# Patient Record
Sex: MALE | HISPANIC OR LATINO | Employment: OTHER | URBAN - NONMETROPOLITAN AREA
[De-identification: names, ages, dates, MRNs, and addresses within clinical notes are randomized per-mention and may not be internally consistent; named-entity substitution may affect disease eponyms.]

---

## 2017-06-21 ENCOUNTER — HOSPITAL ENCOUNTER (OUTPATIENT)
Dept: LAB | Facility: MEDICAL CENTER | Age: 67
End: 2017-06-21
Attending: UROLOGY
Payer: MEDICARE

## 2017-06-21 LAB — PSA SERPL-MCNC: 5.53 NG/ML (ref 0–4)

## 2017-06-21 PROCEDURE — 84153 ASSAY OF PSA TOTAL: CPT

## 2017-06-21 PROCEDURE — 36415 COLL VENOUS BLD VENIPUNCTURE: CPT

## 2018-07-20 ENCOUNTER — HOSPITAL ENCOUNTER (OUTPATIENT)
Dept: LAB | Facility: MEDICAL CENTER | Age: 68
End: 2018-07-20
Attending: UROLOGY
Payer: MEDICARE

## 2018-07-20 LAB — PSA SERPL-MCNC: 5.05 NG/ML (ref 0–4)

## 2018-07-20 PROCEDURE — 84153 ASSAY OF PSA TOTAL: CPT

## 2018-07-20 PROCEDURE — 36415 COLL VENOUS BLD VENIPUNCTURE: CPT

## 2019-08-13 ENCOUNTER — HOSPITAL ENCOUNTER (OUTPATIENT)
Dept: LAB | Facility: MEDICAL CENTER | Age: 69
End: 2019-08-13
Attending: UROLOGY
Payer: MEDICARE

## 2019-08-13 LAB — PSA SERPL-MCNC: 3.55 NG/ML (ref 0–4)

## 2019-08-13 PROCEDURE — 36415 COLL VENOUS BLD VENIPUNCTURE: CPT

## 2019-08-13 PROCEDURE — 84153 ASSAY OF PSA TOTAL: CPT

## 2019-12-03 ENCOUNTER — HOSPITAL ENCOUNTER (OUTPATIENT)
Dept: LAB | Facility: MEDICAL CENTER | Age: 69
End: 2019-12-03
Attending: STUDENT IN AN ORGANIZED HEALTH CARE EDUCATION/TRAINING PROGRAM
Payer: MEDICARE

## 2019-12-03 LAB
ALBUMIN SERPL BCP-MCNC: 4.4 G/DL (ref 3.2–4.9)
ALBUMIN/GLOB SERPL: 1.6 G/DL
ALP SERPL-CCNC: 67 U/L (ref 30–99)
ALT SERPL-CCNC: 22 U/L (ref 2–50)
ANION GAP SERPL CALC-SCNC: 5 MMOL/L (ref 0–11.9)
AST SERPL-CCNC: 19 U/L (ref 12–45)
BASOPHILS # BLD AUTO: 1.1 % (ref 0–1.8)
BASOPHILS # BLD: 0.07 K/UL (ref 0–0.12)
BILIRUB SERPL-MCNC: 0.5 MG/DL (ref 0.1–1.5)
BUN SERPL-MCNC: 13 MG/DL (ref 8–22)
CALCIUM SERPL-MCNC: 9.2 MG/DL (ref 8.5–10.5)
CHLORIDE SERPL-SCNC: 105 MMOL/L (ref 96–112)
CHOLEST SERPL-MCNC: 191 MG/DL (ref 100–199)
CO2 SERPL-SCNC: 30 MMOL/L (ref 20–33)
CREAT SERPL-MCNC: 0.73 MG/DL (ref 0.5–1.4)
EOSINOPHIL # BLD AUTO: 0.31 K/UL (ref 0–0.51)
EOSINOPHIL NFR BLD: 4.7 % (ref 0–6.9)
ERYTHROCYTE [DISTWIDTH] IN BLOOD BY AUTOMATED COUNT: 45.6 FL (ref 35.9–50)
FASTING STATUS PATIENT QL REPORTED: NORMAL
GLOBULIN SER CALC-MCNC: 2.7 G/DL (ref 1.9–3.5)
GLUCOSE SERPL-MCNC: 96 MG/DL (ref 65–99)
HCT VFR BLD AUTO: 49.1 % (ref 42–52)
HDLC SERPL-MCNC: 47 MG/DL
HGB BLD-MCNC: 15.9 G/DL (ref 14–18)
IMM GRANULOCYTES # BLD AUTO: 0.02 K/UL (ref 0–0.11)
IMM GRANULOCYTES NFR BLD AUTO: 0.3 % (ref 0–0.9)
LDLC SERPL CALC-MCNC: 118 MG/DL
LYMPHOCYTES # BLD AUTO: 2.26 K/UL (ref 1–4.8)
LYMPHOCYTES NFR BLD: 34 % (ref 22–41)
MCH RBC QN AUTO: 30.8 PG (ref 27–33)
MCHC RBC AUTO-ENTMCNC: 32.4 G/DL (ref 33.7–35.3)
MCV RBC AUTO: 95.2 FL (ref 81.4–97.8)
MONOCYTES # BLD AUTO: 0.63 K/UL (ref 0–0.85)
MONOCYTES NFR BLD AUTO: 9.5 % (ref 0–13.4)
NEUTROPHILS # BLD AUTO: 3.35 K/UL (ref 1.82–7.42)
NEUTROPHILS NFR BLD: 50.4 % (ref 44–72)
NRBC # BLD AUTO: 0 K/UL
NRBC BLD-RTO: 0 /100 WBC
PLATELET # BLD AUTO: 175 K/UL (ref 164–446)
PMV BLD AUTO: 11.3 FL (ref 9–12.9)
POTASSIUM SERPL-SCNC: 5.1 MMOL/L (ref 3.6–5.5)
PROT SERPL-MCNC: 7.1 G/DL (ref 6–8.2)
RBC # BLD AUTO: 5.16 M/UL (ref 4.7–6.1)
SODIUM SERPL-SCNC: 140 MMOL/L (ref 135–145)
TRIGL SERPL-MCNC: 128 MG/DL (ref 0–149)
WBC # BLD AUTO: 6.6 K/UL (ref 4.8–10.8)

## 2019-12-03 PROCEDURE — 36415 COLL VENOUS BLD VENIPUNCTURE: CPT | Mod: GY

## 2019-12-03 PROCEDURE — 80061 LIPID PANEL: CPT | Mod: GY

## 2019-12-03 PROCEDURE — 80053 COMPREHEN METABOLIC PANEL: CPT | Mod: GY

## 2019-12-03 PROCEDURE — 85025 COMPLETE CBC W/AUTO DIFF WBC: CPT | Mod: GY

## 2019-12-23 DIAGNOSIS — Z01.810 PRE-OPERATIVE CARDIOVASCULAR EXAMINATION: ICD-10-CM

## 2019-12-23 PROCEDURE — 93005 ELECTROCARDIOGRAM TRACING: CPT

## 2019-12-23 PROCEDURE — 93010 ELECTROCARDIOGRAM REPORT: CPT | Performed by: INTERNAL MEDICINE

## 2019-12-23 RX ORDER — ATORVASTATIN CALCIUM 40 MG/1
40 TABLET, FILM COATED ORAL NIGHTLY
COMMUNITY

## 2019-12-24 LAB — EKG IMPRESSION: NORMAL

## 2019-12-26 ENCOUNTER — ANESTHESIA (OUTPATIENT)
Dept: SURGERY | Facility: MEDICAL CENTER | Age: 69
End: 2019-12-26
Payer: MEDICARE

## 2019-12-26 ENCOUNTER — ANESTHESIA EVENT (OUTPATIENT)
Dept: SURGERY | Facility: MEDICAL CENTER | Age: 69
End: 2019-12-26
Payer: MEDICARE

## 2019-12-26 ENCOUNTER — HOSPITAL ENCOUNTER (OUTPATIENT)
Facility: MEDICAL CENTER | Age: 69
End: 2019-12-26
Attending: SURGERY | Admitting: SURGERY
Payer: MEDICARE

## 2019-12-26 VITALS
DIASTOLIC BLOOD PRESSURE: 57 MMHG | SYSTOLIC BLOOD PRESSURE: 96 MMHG | TEMPERATURE: 97.6 F | OXYGEN SATURATION: 97 % | BODY MASS INDEX: 27.49 KG/M2 | HEIGHT: 70 IN | RESPIRATION RATE: 20 BRPM | WEIGHT: 192.02 LBS | HEART RATE: 58 BPM

## 2019-12-26 DIAGNOSIS — G89.18 ACUTE POST-OPERATIVE PAIN: ICD-10-CM

## 2019-12-26 PROBLEM — R22.32 MASS OF SKIN OF LEFT SHOULDER: Status: ACTIVE | Noted: 2019-12-26

## 2019-12-26 LAB — PATHOLOGY CONSULT NOTE: NORMAL

## 2019-12-26 PROCEDURE — 88305 TISSUE EXAM BY PATHOLOGIST: CPT

## 2019-12-26 PROCEDURE — 160002 HCHG RECOVERY MINUTES (STAT): Performed by: SURGERY

## 2019-12-26 PROCEDURE — A6402 STERILE GAUZE <= 16 SQ IN: HCPCS | Performed by: SURGERY

## 2019-12-26 PROCEDURE — 700105 HCHG RX REV CODE 258: Performed by: SURGERY

## 2019-12-26 PROCEDURE — 501838 HCHG SUTURE GENERAL: Performed by: SURGERY

## 2019-12-26 PROCEDURE — 160046 HCHG PACU - 1ST 60 MINS PHASE II: Performed by: SURGERY

## 2019-12-26 PROCEDURE — 160009 HCHG ANES TIME/MIN: Performed by: SURGERY

## 2019-12-26 PROCEDURE — 160025 RECOVERY II MINUTES (STATS): Performed by: SURGERY

## 2019-12-26 PROCEDURE — 700111 HCHG RX REV CODE 636 W/ 250 OVERRIDE (IP): Performed by: ANESTHESIOLOGY

## 2019-12-26 PROCEDURE — 160039 HCHG SURGERY MINUTES - EA ADDL 1 MIN LEVEL 3: Performed by: SURGERY

## 2019-12-26 PROCEDURE — 88304 TISSUE EXAM BY PATHOLOGIST: CPT

## 2019-12-26 PROCEDURE — 160028 HCHG SURGERY MINUTES - 1ST 30 MINS LEVEL 3: Performed by: SURGERY

## 2019-12-26 PROCEDURE — 160035 HCHG PACU - 1ST 60 MINS PHASE I: Performed by: SURGERY

## 2019-12-26 PROCEDURE — 160048 HCHG OR STATISTICAL LEVEL 1-5: Performed by: SURGERY

## 2019-12-26 PROCEDURE — 700101 HCHG RX REV CODE 250: Performed by: SURGERY

## 2019-12-26 RX ORDER — MIDAZOLAM HYDROCHLORIDE 1 MG/ML
INJECTION INTRAMUSCULAR; INTRAVENOUS PRN
Status: DISCONTINUED | OUTPATIENT
Start: 2019-12-26 | End: 2019-12-26 | Stop reason: SURG

## 2019-12-26 RX ORDER — ACETAMINOPHEN 500 MG
1000 TABLET ORAL ONCE
Status: DISCONTINUED | OUTPATIENT
Start: 2019-12-26 | End: 2019-12-26 | Stop reason: HOSPADM

## 2019-12-26 RX ORDER — OXYCODONE HYDROCHLORIDE 5 MG/1
5 TABLET ORAL EVERY 4 HOURS PRN
Qty: 9 TAB | Refills: 0 | Status: SHIPPED | OUTPATIENT
Start: 2019-12-26 | End: 2019-12-29

## 2019-12-26 RX ORDER — OXYCODONE HYDROCHLORIDE 5 MG/1
5 TABLET ORAL EVERY 4 HOURS PRN
Status: DISCONTINUED | OUTPATIENT
Start: 2019-12-26 | End: 2019-12-26 | Stop reason: HOSPADM

## 2019-12-26 RX ORDER — ONDANSETRON 2 MG/ML
INJECTION INTRAMUSCULAR; INTRAVENOUS PRN
Status: DISCONTINUED | OUTPATIENT
Start: 2019-12-26 | End: 2019-12-26 | Stop reason: SURG

## 2019-12-26 RX ORDER — CEFAZOLIN SODIUM 1 G/3ML
INJECTION, POWDER, FOR SOLUTION INTRAMUSCULAR; INTRAVENOUS PRN
Status: DISCONTINUED | OUTPATIENT
Start: 2019-12-26 | End: 2019-12-26 | Stop reason: SURG

## 2019-12-26 RX ORDER — BUPIVACAINE HYDROCHLORIDE AND EPINEPHRINE 5; 5 MG/ML; UG/ML
INJECTION, SOLUTION EPIDURAL; INTRACAUDAL; PERINEURAL
Status: DISCONTINUED
Start: 2019-12-26 | End: 2019-12-26 | Stop reason: HOSPADM

## 2019-12-26 RX ORDER — SODIUM CHLORIDE, SODIUM LACTATE, POTASSIUM CHLORIDE, CALCIUM CHLORIDE 600; 310; 30; 20 MG/100ML; MG/100ML; MG/100ML; MG/100ML
INJECTION, SOLUTION INTRAVENOUS CONTINUOUS
Status: DISCONTINUED | OUTPATIENT
Start: 2019-12-26 | End: 2019-12-26 | Stop reason: HOSPADM

## 2019-12-26 RX ORDER — BUPIVACAINE HYDROCHLORIDE AND EPINEPHRINE 5; 5 MG/ML; UG/ML
INJECTION, SOLUTION EPIDURAL; INTRACAUDAL; PERINEURAL
Status: DISCONTINUED | OUTPATIENT
Start: 2019-12-26 | End: 2019-12-26 | Stop reason: HOSPADM

## 2019-12-26 RX ADMIN — MIDAZOLAM 2 MG: 1 INJECTION INTRAMUSCULAR; INTRAVENOUS at 12:45

## 2019-12-26 RX ADMIN — PROPOFOL 50 MG: 10 INJECTION, EMULSION INTRAVENOUS at 13:10

## 2019-12-26 RX ADMIN — ONDANSETRON 4 MG: 2 INJECTION INTRAMUSCULAR; INTRAVENOUS at 12:45

## 2019-12-26 RX ADMIN — SODIUM CHLORIDE, POTASSIUM CHLORIDE, SODIUM LACTATE AND CALCIUM CHLORIDE: 600; 310; 30; 20 INJECTION, SOLUTION INTRAVENOUS at 11:08

## 2019-12-26 RX ADMIN — PROPOFOL 200 MG: 10 INJECTION, EMULSION INTRAVENOUS at 12:46

## 2019-12-26 RX ADMIN — CEFAZOLIN 2 G: 330 INJECTION, POWDER, FOR SOLUTION INTRAMUSCULAR; INTRAVENOUS at 12:47

## 2019-12-26 RX ADMIN — FENTANYL CITRATE 100 MCG: 50 INJECTION, SOLUTION INTRAMUSCULAR; INTRAVENOUS at 12:45

## 2019-12-26 ASSESSMENT — PAIN SCALES - GENERAL: PAIN_LEVEL: 0

## 2019-12-26 NOTE — ANESTHESIA POSTPROCEDURE EVALUATION
Patient: Fernando Grissom    Procedure Summary     Date:  12/26/19 Room / Location:  [facility] ROOM 25 / SURGERY SAME DAY [facility]    Anesthesia Start:  1242 Anesthesia Stop:  1333    Procedures:       EXCISION, MASS, GENERAL-ARM (Left Arm Upper)      EXCISION, LESION (Left Chest) Diagnosis:  (SUBCUTANEOUS MASS, LEFT UPPER OUTER ARM 10CM)    Surgeon:  Dmitry Ye M.D. Responsible Provider:  Chuy Fernandez M.D.    Anesthesia Type:  general ASA Status:  2          Final Anesthesia Type: general  Last vitals  BP   Blood Pressure : 147/72    Temp   36.7 °C (98 °F)    Pulse   Pulse: (!) 57   Resp   18    SpO2   94 %      Anesthesia Post Evaluation    Patient location during evaluation: PACU  Patient participation: complete - patient participated  Level of consciousness: awake and alert  Pain score: 0    Airway patency: patent  Anesthetic complications: no  Cardiovascular status: hemodynamically stable  Respiratory status: acceptable  Hydration status: euvolemic    PONV: none           Nurse Pain Score: 0 (NPRS)

## 2019-12-26 NOTE — OR SURGEON
Immediate Post OP Note    PreOp Diagnosis: Left upper arm - lateral shoulder mass / left anterior chest lesion    PostOp Diagnosis: same    Procedure(s):  EXCISION, MASS, GENERAL-ARM - Wound Class: Clean  EXCISION, LESION - Wound Class: Clean    Surgeon(s):  Dmitry Ye M.D.    Anesthesiologist/Type of Anesthesia: Gen with LMA / Local 30 cc 0.5% maracaine with epi  Anesthesiologist: Chuy Fernandez M.D./General    Surgical Staff:  Circulator: Kelly Melton R.N.; Kaya Castro R.N.  Scrub Person: Brandon Rabago    Specimens removed if any:  ID Type Source Tests Collected by Time Destination   A : Left Shoulder Mass Other Other PATHOLOGY SPECIMEN Dmitry Ye M.D. 12/26/2019  1:03 PM    B : Left Chest Mass Other Other PATHOLOGY SPECIMEN Dmitry Ye M.D. 12/26/2019  1:05 PM        Estimated Blood Loss: < 10 cc    Findings: upper arm mass fatty in apearance    Complications: none      12/26/2019 1:29 PM Dmitry Ye M.D.

## 2019-12-26 NOTE — ANESTHESIA PROCEDURE NOTES
Airway  Date/Time: 12/26/2019 12:47 PM  Performed by: Chuy Fernandez M.D.  Authorized by: Chuy Fernandez M.D.     Location:  OR  Urgency:  Elective  Difficult Airway: No    Indications for Airway Management:  Anesthesia  Spontaneous Ventilation: absent    Sedation Level:  Deep  Preoxygenated: Yes    Mask Difficulty Assessment:  1 - vent by mask  Final Airway Type:  Supraglottic airway  Final Supraglottic Airway:  Standard LMA  SGA Size:  5  Number of Attempts at Approach:  1

## 2019-12-26 NOTE — ANESTHESIA TIME REPORT
Anesthesia Start and Stop Event Times     Date Time Event    12/26/2019 1157 Ready for Procedure     1242 Anesthesia Start     1333 Anesthesia Stop        Responsible Staff  12/26/19    Name Role Begin End    Chuy Fernandez M.D. Anesth 1242 1333        Preop Diagnosis (Free Text):  Pre-op Diagnosis     SUBCUTANEOUS MASS, LEFT UPPER OUTER ARM 10CM        Preop Diagnosis (Codes):    Post op Diagnosis  Mass of arm, left      Premium Reason  Non-Premium    Comments:

## 2019-12-27 NOTE — OP REPORT
DATE OF SERVICE:  12/26/2019    PREOPERATIVE DIAGNOSES:  1. Left upper arm/shoulder mass.  2. Left anterior chest lesion.    POSTOPERATIVE DIAGNOSES:  1. Left upper arm/shoulder mass.  2. Left anterior chest lesion.    PROCEDURE PERFORMED:  1. Excision left arm mass approximately 9 cm.  2. Excision left chest lesion.    SURGEON:  Dmitry Ye MD    ANESTHESIOLOGIST:  Chuy Fernandez MD    ANESTHESIA:  General with an LMA, local 30 mL 0.5% Marcaine with epinephrine.    SPECIMENS:  1. Left shoulder mass.  2. Left chest mass.    BLOOD LOSS:  Less than 10 mL    FINDINGS:  Left upper arm as fatty in appearance.    COMPLICATIONS:  None.    INDICATIONS:  The patient is a 69-year-old male who presents with left upper   arm masses increasing in size fairly rapidly.  He has also complaint of a   growth on the left anterior chest.  It is now almost 2 cm, somewhat exophytic   and desires excision of both of these.    DESCRIPTION OF PROCEDURE:  The patient in supine position, general anesthesia   was administered.  Left chest and shoulder areas were shaved.  A roll was   placed on the left shoulder to elevate slightly and the entire areas were   prepped with ChloraPrep and allowed to dry and widely draped.  Local   anesthesia was infiltrated in the skin and subcutaneous tissues around the   skin lesion as well as the ___ block around the shoulder mass.    Starting with the shoulder mass, incision was made parallel to the humerus   directly over the mass.  Cautery was used for subcutaneous tissues until I   reached the underlying fatty tissue.  This was clear and distinct from the   subcutaneous tissues.          breaking down the adhesions, working   posteriorly and then laterally [id number] was able to be brought out through   opening, found attachments medially and superiorly.  This was  from   surrounding tissues using cautery.  Once it was completely freed, it was   passed off as specimen, approximately 9 x 4  cm.  A few small points of   bleeding were controlled with cautery.  The wound was then packed with gauze.    Attention now turned to the chest lesion.  Again local anesthesia had been   infiltrated around this.  The skin was incised around this as ellipse.  The   area was grasped and elevated.  Cautery was used to separate this mass from   the underlying tissues.  Once it was freed, passed off as specimen.  A few   small points of bleeding were controlled with cautery.  Subcutaneous tissues   were then reapproximated with interrupted 3-0 Vicryl sutures.  There was good   reapproximation of tissues.  Area was clean and dry at the end.  Benzoin and   Steri-Strips followed by gauze and Tegaderm were applied.  Attention now   turned back to the shoulder.  The gauze was removed.  The area was irrigated.    A few small points of bleeding were noted.  This was easily controlled with   cautery.  Once satisfied with hemostasis, subcutaneous tissues were   reapproximated in layers with interrupted 3-0 Vicryl sutures and finally the   skin was reapproximated with a running 4-0 Vicryl subcuticular suture.  Area   was cleaned and dried.  Benzoin and Steri-Strips were applied followed by   gauze and Tegaderm.  There was some puckering on the sides as expected since   the large mass was removed.  Gauze and Tegaderm were applied over this.  The   patient tolerated the procedure well, was extubated in the OR, brought to   recovery room.  Plan to be discharge home later today.             ____________________________________     MD SERGIO MELENDEZ / ASAEL    DD:  12/26/2019 15:11:21  DT:  12/26/2019 15:24:55    D#:  [id number]  Job#:  [id number]

## 2020-06-17 NOTE — OR NURSING
1331-Received via gurney from [facility] for thick clear secretions.  Dressings to left arm & chest C/D/I    1335-More relaxed.  RR even, unlabored    1345-Daughter in to visit.  More awake.  Denies pain    1400-Refusing PO fluids.  A/A/O x4  Transferred to phase II per criteria    1430-Talking with daughter.  Dressed.  States comfort, gait steady    1452-02 sat stable on room air. Discharge instructions given. Iv removed.  DC Ambulatory to lobby with staff and daughter.  
TELE/STEPDOWN

## 2020-08-19 ENCOUNTER — HOSPITAL ENCOUNTER (OUTPATIENT)
Dept: LAB | Facility: MEDICAL CENTER | Age: 70
End: 2020-08-19
Attending: UROLOGY
Payer: MEDICARE

## 2020-08-19 LAB — PSA SERPL-MCNC: 2.97 NG/ML (ref 0–4)

## 2020-08-19 PROCEDURE — 36415 COLL VENOUS BLD VENIPUNCTURE: CPT

## 2020-08-19 PROCEDURE — 84153 ASSAY OF PSA TOTAL: CPT

## 2021-04-21 ENCOUNTER — IMMUNIZATION (OUTPATIENT)
Dept: FAMILY PLANNING/WOMEN'S HEALTH CLINIC | Facility: IMMUNIZATION CENTER | Age: 71
End: 2021-04-21
Payer: MEDICARE

## 2021-04-21 DIAGNOSIS — Z23 ENCOUNTER FOR VACCINATION: Primary | ICD-10-CM

## 2021-04-21 PROCEDURE — 0001A PFIZER SARS-COV-2 VACCINE: CPT

## 2021-04-21 PROCEDURE — 91300 PFIZER SARS-COV-2 VACCINE: CPT

## 2021-05-14 ENCOUNTER — IMMUNIZATION (OUTPATIENT)
Dept: FAMILY PLANNING/WOMEN'S HEALTH CLINIC | Facility: IMMUNIZATION CENTER | Age: 71
End: 2021-05-14
Attending: INTERNAL MEDICINE
Payer: MEDICARE

## 2021-05-14 DIAGNOSIS — Z23 ENCOUNTER FOR VACCINATION: Primary | ICD-10-CM

## 2021-05-14 PROCEDURE — 91300 PFIZER SARS-COV-2 VACCINE: CPT | Performed by: INTERNAL MEDICINE

## 2021-05-14 PROCEDURE — 0002A PFIZER SARS-COV-2 VACCINE: CPT | Performed by: INTERNAL MEDICINE

## 2021-09-22 ENCOUNTER — HOSPITAL ENCOUNTER (OUTPATIENT)
Dept: LAB | Facility: MEDICAL CENTER | Age: 71
End: 2021-09-22
Attending: UROLOGY
Payer: MEDICARE

## 2021-09-22 LAB — PSA SERPL-MCNC: 3.97 NG/ML (ref 0–4)

## 2021-09-22 PROCEDURE — 84153 ASSAY OF PSA TOTAL: CPT

## 2021-09-22 PROCEDURE — 36415 COLL VENOUS BLD VENIPUNCTURE: CPT

## 2022-12-03 ENCOUNTER — HOSPITAL ENCOUNTER (OUTPATIENT)
Dept: LAB | Facility: MEDICAL CENTER | Age: 72
End: 2022-12-03
Attending: UROLOGY
Payer: MEDICARE

## 2022-12-03 LAB — PSA SERPL-MCNC: 3.48 NG/ML (ref 0–4)

## 2022-12-03 PROCEDURE — 36415 COLL VENOUS BLD VENIPUNCTURE: CPT

## 2022-12-03 PROCEDURE — 84153 ASSAY OF PSA TOTAL: CPT

## 2024-05-09 ENCOUNTER — OFFICE VISIT (OUTPATIENT)
Dept: URGENT CARE | Facility: PHYSICIAN GROUP | Age: 74
End: 2024-05-09
Payer: MEDICARE

## 2024-05-09 VITALS
HEART RATE: 64 BPM | RESPIRATION RATE: 16 BRPM | HEIGHT: 72 IN | TEMPERATURE: 97.7 F | OXYGEN SATURATION: 92 % | SYSTOLIC BLOOD PRESSURE: 132 MMHG | BODY MASS INDEX: 25.47 KG/M2 | WEIGHT: 188 LBS | DIASTOLIC BLOOD PRESSURE: 84 MMHG

## 2024-05-09 DIAGNOSIS — R06.2 WHEEZING: ICD-10-CM

## 2024-05-09 DIAGNOSIS — J98.8 RTI (RESPIRATORY TRACT INFECTION): ICD-10-CM

## 2024-05-09 PROBLEM — M79.89 SOFT TISSUE MASS: Status: ACTIVE | Noted: 2019-11-08

## 2024-05-09 PROBLEM — E78.5 HYPERLIPIDEMIA, UNSPECIFIED: Status: ACTIVE | Noted: 2019-12-09

## 2024-05-09 PROBLEM — Z85.72 HX OF LYMPHOMA: Status: ACTIVE | Noted: 2019-11-08

## 2024-05-09 PROCEDURE — 99203 OFFICE O/P NEW LOW 30 MIN: CPT | Performed by: FAMILY MEDICINE

## 2024-05-09 PROCEDURE — 3075F SYST BP GE 130 - 139MM HG: CPT | Performed by: FAMILY MEDICINE

## 2024-05-09 PROCEDURE — 3079F DIAST BP 80-89 MM HG: CPT | Performed by: FAMILY MEDICINE

## 2024-05-09 RX ORDER — ALBUTEROL SULFATE 90 UG/1
2 AEROSOL, METERED RESPIRATORY (INHALATION) EVERY 6 HOURS PRN
Qty: 8.5 G | Refills: 0 | Status: SHIPPED | OUTPATIENT
Start: 2024-05-09

## 2024-05-09 RX ORDER — DOXYCYCLINE HYCLATE 100 MG
100 TABLET ORAL EVERY 12 HOURS
Qty: 14 TABLET | Refills: 0 | Status: SHIPPED | OUTPATIENT
Start: 2024-05-09 | End: 2024-05-16

## 2024-05-09 RX ORDER — BENZONATATE 200 MG/1
200 CAPSULE ORAL 3 TIMES DAILY PRN
Qty: 30 CAPSULE | Refills: 0 | Status: SHIPPED | OUTPATIENT
Start: 2024-05-09

## 2024-05-09 RX ORDER — DEXAMETHASONE 6 MG/1
6 TABLET ORAL DAILY
Qty: 3 TABLET | Refills: 0 | Status: SHIPPED | OUTPATIENT
Start: 2024-05-09

## 2024-05-09 ASSESSMENT — ENCOUNTER SYMPTOMS
COUGH: 1
SPUTUM PRODUCTION: 1

## 2024-05-09 NOTE — PROGRESS NOTES
Subjective     Fernando Grissom is a 73 y.o. male who presents with Cough (X 1 week with congestion. )    - This is a very pleasant 73 y.o. who has come to the walk-in clinic today for approximately 1 week coughing and nonbloody sputum.  Cough worse at nighttime.  Feels chest congestion and hurts ribs at times to cough.  No nausea vomiting fevers chills.    Romanian iPad  used          ALLERGIES:  Patient has no known allergies.     PMH:  Past Medical History:   Diagnosis Date    High cholesterol     History of BPH     S/P TURP    Lymphoma (HCC) 2010        PSH:  Past Surgical History:   Procedure Laterality Date    MASS EXCISION GENERAL Left 12/26/2019    Procedure: EXCISION, MASS, GENERAL-ARM;  Surgeon: Dmitry Ye M.D.;  Location: SURGERY SAME DAY [facility];  Service: General    LESION EXCISION ORTHO Left 12/26/2019    Procedure: EXCISION, LESION;  Surgeon: Dmitry Ye M.D.;  Location: SURGERY SAME DAY [facility];  Service: General    TURP-VAPOR  2017    BONE MARROW ASPIRATION  10/26/2010    Performed by JOAQUIM CRUZ at ENDOSCOPY [facility] ORS    BONE MARROW BIOPSY, NDL/TROCAR  10/26/2010    Performed by JOAQUIM CRUZ at ENDOSCOPY [facility] ORS    TONSILLECTOMY  9/17/2010    Performed by TESS PETTIT at SURGERY SAME DAY [facility]    OTHER ORTHOPEDIC SURGERY         MEDS:    Current Outpatient Medications:     dexamethasone (DECADRON) 6 MG Tab, Take 1 Tablet by mouth every day., Disp: 3 Tablet, Rfl: 0    albuterol (PROAIR HFA) 108 (90 Base) MCG/ACT Aero Soln inhalation aerosol, Inhale 2 Puffs every 6 hours as needed (cough/wheeze)., Disp: 8.5 g, Rfl: 0    doxycycline (VIBRAMYCIN) 100 MG Tab, Take 1 Tablet by mouth every 12 hours for 7 days., Disp: 14 Tablet, Rfl: 0    benzonatate (TESSALON) 200 MG capsule, Take 1 Capsule by mouth 3 times a day as needed for Cough., Disp: 30 Capsule, Rfl: 0    atorvastatin (LIPITOR) 40 MG Tab, Take 40 mg by mouth every evening.  (Patient not taking: Reported on 5/9/2024), Disp: , Rfl:     ** I have documented what I find to be significant in regards to past medical, social, family and surgical history  in my HPI or under PMH/PSH/FH review section, otherwise it is noncontributory **           HPI    Review of Systems   Respiratory:  Positive for cough and sputum production.    All other systems reviewed and are negative.             Objective     /84   Pulse 64   Temp 36.5 °C (97.7 °F) (Temporal)   Resp 16   Ht 1.829 m (6')   Wt 85.3 kg (188 lb)   SpO2 92%   BMI 25.50 kg/m²      Physical Exam  Vitals and nursing note reviewed.   Constitutional:       General: He is not in acute distress.     Appearance: Normal appearance. He is well-developed.   HENT:      Head: Normocephalic.      Mouth/Throat:      Mouth: Mucous membranes are moist.      Pharynx: Oropharynx is clear. No oropharyngeal exudate or posterior oropharyngeal erythema.   Cardiovascular:      Heart sounds: Normal heart sounds. No murmur heard.  Pulmonary:      Effort: Pulmonary effort is normal. No respiratory distress.      Breath sounds: Wheezing present.   Neurological:      Mental Status: He is alert.      Motor: No abnormal muscle tone.   Psychiatric:         Mood and Affect: Mood normal.         Behavior: Behavior normal.                             Assessment & Plan     1. RTI (respiratory tract infection)  doxycycline (VIBRAMYCIN) 100 MG Tab    benzonatate (TESSALON) 200 MG capsule      2. Wheezing  dexamethasone (DECADRON) 6 MG Tab    albuterol (PROAIR HFA) 108 (90 Base) MCG/ACT Aero Soln inhalation aerosol          - Dx, plan & d/c instructions discussed   - Rest, stay hydrated      Follow up with your regular primary care providers office within a week to keep them updated and informed of this visit and for regular routine health maintenance check-ups. ER if not improving in 2-3 days or if feeling/getting worse. (If you do not have a primary care provider  and need to schedule one you may call [facility] at [phone] to do this).        Patient left in stable condition         Discussed if any testing, labs or imaging studies are obtained outside of the [facility] facility, it is their responsibility to contact the Urgent Care and let us know that it was done and get us the results so adequate follow up can be initiated    Pertinent prior lab work and/or imaging studies in Epic have been reviewed by me today on day of this visit and taken into account for my treatment and plan today    Pertinent PMH/PSH and/or chronic conditions and medications if any were reviewed today and taken into account for my treatment and plan today    Pertinent prior office visit notes in [facility] have been reviewed by me today on day of this visit.    Please note that this dictation may have been created using voice recognition software, if so I have made every reasonable attempt to correct obvious errors, but I expect that there are errors of grammar and possibly content that I did not discover before finalizing the note.

## 2025-03-12 ENCOUNTER — HOSPITAL ENCOUNTER (INPATIENT)
Facility: MEDICAL CENTER | Age: 75
LOS: 1 days | DRG: 189 | End: 2025-03-13
Attending: STUDENT IN AN ORGANIZED HEALTH CARE EDUCATION/TRAINING PROGRAM | Admitting: FAMILY MEDICINE
Payer: MEDICARE

## 2025-03-12 ENCOUNTER — APPOINTMENT (OUTPATIENT)
Dept: RADIOLOGY | Facility: MEDICAL CENTER | Age: 75
DRG: 189 | End: 2025-03-12
Attending: STUDENT IN AN ORGANIZED HEALTH CARE EDUCATION/TRAINING PROGRAM
Payer: MEDICARE

## 2025-03-12 ENCOUNTER — OFFICE VISIT (OUTPATIENT)
Dept: URGENT CARE | Facility: PHYSICIAN GROUP | Age: 75
End: 2025-03-12
Payer: MEDICARE

## 2025-03-12 VITALS
TEMPERATURE: 99.1 F | HEIGHT: 72 IN | WEIGHT: 189 LBS | HEART RATE: 69 BPM | DIASTOLIC BLOOD PRESSURE: 82 MMHG | OXYGEN SATURATION: 94 % | SYSTOLIC BLOOD PRESSURE: 146 MMHG | BODY MASS INDEX: 25.6 KG/M2 | RESPIRATION RATE: 24 BRPM

## 2025-03-12 DIAGNOSIS — J96.01 ACUTE HYPOXIC RESPIRATORY FAILURE (HCC): ICD-10-CM

## 2025-03-12 DIAGNOSIS — R05.8 PRODUCTIVE COUGH: ICD-10-CM

## 2025-03-12 DIAGNOSIS — R09.02 HYPOXIA: ICD-10-CM

## 2025-03-12 DIAGNOSIS — R06.2 WHEEZING: ICD-10-CM

## 2025-03-12 DIAGNOSIS — J45.901 REACTIVE AIRWAY DISEASE WITH ACUTE EXACERBATION, UNSPECIFIED ASTHMA SEVERITY, UNSPECIFIED WHETHER PERSISTENT: ICD-10-CM

## 2025-03-12 DIAGNOSIS — R06.02 SHORTNESS OF BREATH: ICD-10-CM

## 2025-03-12 LAB
ALBUMIN SERPL BCP-MCNC: 4.3 G/DL (ref 3.2–4.9)
ALBUMIN/GLOB SERPL: 1.2 G/DL
ALP SERPL-CCNC: 83 U/L (ref 30–99)
ALT SERPL-CCNC: 24 U/L (ref 2–50)
ANION GAP SERPL CALC-SCNC: 12 MMOL/L (ref 7–16)
AST SERPL-CCNC: 32 U/L (ref 12–45)
BASOPHILS # BLD AUTO: 0.4 % (ref 0–1.8)
BASOPHILS # BLD: 0.03 K/UL (ref 0–0.12)
BILIRUB SERPL-MCNC: 0.5 MG/DL (ref 0.1–1.5)
BUN SERPL-MCNC: 10 MG/DL (ref 8–22)
CALCIUM ALBUM COR SERPL-MCNC: 9.1 MG/DL (ref 8.5–10.5)
CALCIUM SERPL-MCNC: 9.3 MG/DL (ref 8.5–10.5)
CHLORIDE SERPL-SCNC: 100 MMOL/L (ref 96–112)
CO2 SERPL-SCNC: 26 MMOL/L (ref 20–33)
CREAT SERPL-MCNC: 0.86 MG/DL (ref 0.5–1.4)
EKG IMPRESSION: NORMAL
EOSINOPHIL # BLD AUTO: 0.11 K/UL (ref 0–0.51)
EOSINOPHIL NFR BLD: 1.6 % (ref 0–6.9)
ERYTHROCYTE [DISTWIDTH] IN BLOOD BY AUTOMATED COUNT: 45 FL (ref 35.9–50)
FLUAV RNA SPEC QL NAA+PROBE: NEGATIVE
FLUAV RNA SPEC QL NAA+PROBE: NEGATIVE
FLUBV RNA SPEC QL NAA+PROBE: NEGATIVE
FLUBV RNA SPEC QL NAA+PROBE: NEGATIVE
GFR SERPLBLD CREATININE-BSD FMLA CKD-EPI: 91 ML/MIN/1.73 M 2
GLOBULIN SER CALC-MCNC: 3.6 G/DL (ref 1.9–3.5)
GLUCOSE SERPL-MCNC: 108 MG/DL (ref 65–99)
HCT VFR BLD AUTO: 48.2 % (ref 42–52)
HGB BLD-MCNC: 15.9 G/DL (ref 14–18)
IMM GRANULOCYTES # BLD AUTO: 0.02 K/UL (ref 0–0.11)
IMM GRANULOCYTES NFR BLD AUTO: 0.3 % (ref 0–0.9)
LYMPHOCYTES # BLD AUTO: 2.05 K/UL (ref 1–4.8)
LYMPHOCYTES NFR BLD: 29.2 % (ref 22–41)
MCH RBC QN AUTO: 31 PG (ref 27–33)
MCHC RBC AUTO-ENTMCNC: 33 G/DL (ref 32.3–36.5)
MCV RBC AUTO: 94 FL (ref 81.4–97.8)
MONOCYTES # BLD AUTO: 1.18 K/UL (ref 0–0.85)
MONOCYTES NFR BLD AUTO: 16.8 % (ref 0–13.4)
NEUTROPHILS # BLD AUTO: 3.62 K/UL (ref 1.82–7.42)
NEUTROPHILS NFR BLD: 51.7 % (ref 44–72)
NRBC # BLD AUTO: 0 K/UL
NRBC BLD-RTO: 0 /100 WBC (ref 0–0.2)
NT-PROBNP SERPL IA-MCNC: 133 PG/ML (ref 0–125)
PLATELET # BLD AUTO: 146 K/UL (ref 164–446)
PMV BLD AUTO: 10.5 FL (ref 9–12.9)
POTASSIUM SERPL-SCNC: 3.9 MMOL/L (ref 3.6–5.5)
PROCALCITONIN SERPL-MCNC: 0.08 NG/ML
PROT SERPL-MCNC: 7.9 G/DL (ref 6–8.2)
RBC # BLD AUTO: 5.13 M/UL (ref 4.7–6.1)
RSV RNA SPEC QL NAA+PROBE: NEGATIVE
RSV RNA SPEC QL NAA+PROBE: NEGATIVE
SARS-COV-2 RNA RESP QL NAA+PROBE: NEGATIVE
SARS-COV-2 RNA RESP QL NAA+PROBE: NOTDETECTED
SODIUM SERPL-SCNC: 138 MMOL/L (ref 135–145)
TROPONIN T SERPL-MCNC: 6 NG/L (ref 6–19)
WBC # BLD AUTO: 7 K/UL (ref 4.8–10.8)

## 2025-03-12 PROCEDURE — 85025 COMPLETE CBC W/AUTO DIFF WBC: CPT

## 2025-03-12 PROCEDURE — 96374 THER/PROPH/DIAG INJ IV PUSH: CPT

## 2025-03-12 PROCEDURE — 0241U POCT CEPHEID COV-2, FLU A/B, RSV - PCR: CPT

## 2025-03-12 PROCEDURE — 700102 HCHG RX REV CODE 250 W/ 637 OVERRIDE(OP): Performed by: STUDENT IN AN ORGANIZED HEALTH CARE EDUCATION/TRAINING PROGRAM

## 2025-03-12 PROCEDURE — 94640 AIRWAY INHALATION TREATMENT: CPT

## 2025-03-12 PROCEDURE — 99285 EMERGENCY DEPT VISIT HI MDM: CPT

## 2025-03-12 PROCEDURE — 96375 TX/PRO/DX INJ NEW DRUG ADDON: CPT

## 2025-03-12 PROCEDURE — 700101 HCHG RX REV CODE 250: Performed by: STUDENT IN AN ORGANIZED HEALTH CARE EDUCATION/TRAINING PROGRAM

## 2025-03-12 PROCEDURE — 700102 HCHG RX REV CODE 250 W/ 637 OVERRIDE(OP): Performed by: BEHAVIOR ANALYST

## 2025-03-12 PROCEDURE — 71045 X-RAY EXAM CHEST 1 VIEW: CPT

## 2025-03-12 PROCEDURE — A9270 NON-COVERED ITEM OR SERVICE: HCPCS | Performed by: STUDENT IN AN ORGANIZED HEALTH CARE EDUCATION/TRAINING PROGRAM

## 2025-03-12 PROCEDURE — 93005 ELECTROCARDIOGRAM TRACING: CPT | Mod: TC | Performed by: STUDENT IN AN ORGANIZED HEALTH CARE EDUCATION/TRAINING PROGRAM

## 2025-03-12 PROCEDURE — 80053 COMPREHEN METABOLIC PANEL: CPT

## 2025-03-12 PROCEDURE — 700111 HCHG RX REV CODE 636 W/ 250 OVERRIDE (IP): Mod: JZ | Performed by: STUDENT IN AN ORGANIZED HEALTH CARE EDUCATION/TRAINING PROGRAM

## 2025-03-12 PROCEDURE — 770001 HCHG ROOM/CARE - MED/SURG/GYN PRIV*

## 2025-03-12 PROCEDURE — A9270 NON-COVERED ITEM OR SERVICE: HCPCS | Performed by: BEHAVIOR ANALYST

## 2025-03-12 PROCEDURE — 84484 ASSAY OF TROPONIN QUANT: CPT

## 2025-03-12 PROCEDURE — 0241U HCHG SARS-COV-2 COVID-19 NFCT DS RESP RNA 4 TRGT ED POC: CPT

## 2025-03-12 PROCEDURE — 84145 PROCALCITONIN (PCT): CPT

## 2025-03-12 PROCEDURE — 36415 COLL VENOUS BLD VENIPUNCTURE: CPT

## 2025-03-12 PROCEDURE — 83880 ASSAY OF NATRIURETIC PEPTIDE: CPT

## 2025-03-12 PROCEDURE — 99213 OFFICE O/P EST LOW 20 MIN: CPT | Mod: 25

## 2025-03-12 RX ORDER — IPRATROPIUM BROMIDE AND ALBUTEROL SULFATE 2.5; .5 MG/3ML; MG/3ML
3 SOLUTION RESPIRATORY (INHALATION)
Status: DISCONTINUED | OUTPATIENT
Start: 2025-03-12 | End: 2025-03-13

## 2025-03-12 RX ORDER — AZITHROMYCIN 250 MG/1
500 TABLET, FILM COATED ORAL DAILY
Status: DISCONTINUED | OUTPATIENT
Start: 2025-03-13 | End: 2025-03-13 | Stop reason: HOSPADM

## 2025-03-12 RX ORDER — PREDNISONE 50 MG/1
50 TABLET ORAL DAILY
Status: DISCONTINUED | OUTPATIENT
Start: 2025-03-13 | End: 2025-03-13 | Stop reason: HOSPADM

## 2025-03-12 RX ORDER — ENOXAPARIN SODIUM 100 MG/ML
40 INJECTION SUBCUTANEOUS DAILY
Status: DISCONTINUED | OUTPATIENT
Start: 2025-03-12 | End: 2025-03-13 | Stop reason: HOSPADM

## 2025-03-12 RX ORDER — IPRATROPIUM BROMIDE AND ALBUTEROL SULFATE 2.5; .5 MG/3ML; MG/3ML
3 SOLUTION RESPIRATORY (INHALATION) ONCE
Status: COMPLETED | OUTPATIENT
Start: 2025-03-12 | End: 2025-03-12

## 2025-03-12 RX ORDER — CEFTRIAXONE 2 G/1
2000 INJECTION, POWDER, FOR SOLUTION INTRAMUSCULAR; INTRAVENOUS ONCE
Status: COMPLETED | OUTPATIENT
Start: 2025-03-12 | End: 2025-03-12

## 2025-03-12 RX ORDER — AZITHROMYCIN 250 MG/1
500 TABLET, FILM COATED ORAL ONCE
Status: COMPLETED | OUTPATIENT
Start: 2025-03-12 | End: 2025-03-12

## 2025-03-12 RX ORDER — ACETAMINOPHEN 325 MG/1
650 TABLET ORAL EVERY 4 HOURS PRN
Status: DISCONTINUED | OUTPATIENT
Start: 2025-03-12 | End: 2025-03-13 | Stop reason: HOSPADM

## 2025-03-12 RX ORDER — ALBUTEROL SULFATE 90 UG/1
2 INHALANT RESPIRATORY (INHALATION)
Status: DISCONTINUED | OUTPATIENT
Start: 2025-03-12 | End: 2025-03-13 | Stop reason: HOSPADM

## 2025-03-12 RX ORDER — METHYLPREDNISOLONE SODIUM SUCCINATE 125 MG/2ML
125 INJECTION, POWDER, LYOPHILIZED, FOR SOLUTION INTRAMUSCULAR; INTRAVENOUS ONCE
Status: COMPLETED | OUTPATIENT
Start: 2025-03-12 | End: 2025-03-12

## 2025-03-12 RX ORDER — ALBUTEROL SULFATE 90 UG/1
2 INHALANT RESPIRATORY (INHALATION) EVERY 6 HOURS PRN
Qty: 8.5 G | Refills: 0 | Status: SHIPPED | OUTPATIENT
Start: 2025-03-12 | End: 2025-03-12

## 2025-03-12 RX ADMIN — ALBUTEROL SULFATE 2 PUFF: 90 AEROSOL, METERED RESPIRATORY (INHALATION) at 23:36

## 2025-03-12 RX ADMIN — IPRATROPIUM BROMIDE AND ALBUTEROL SULFATE 3 ML: 2.5; .5 SOLUTION RESPIRATORY (INHALATION) at 16:22

## 2025-03-12 RX ADMIN — CEFTRIAXONE SODIUM 2000 MG: 2 INJECTION, POWDER, FOR SOLUTION INTRAMUSCULAR; INTRAVENOUS at 18:32

## 2025-03-12 RX ADMIN — AZITHROMYCIN DIHYDRATE 500 MG: 250 TABLET ORAL at 18:33

## 2025-03-12 RX ADMIN — METHYLPREDNISOLONE SODIUM SUCCINATE 125 MG: 125 INJECTION, POWDER, FOR SOLUTION INTRAMUSCULAR; INTRAVENOUS at 18:15

## 2025-03-12 RX ADMIN — ACETAMINOPHEN 650 MG: 325 TABLET, FILM COATED ORAL at 22:14

## 2025-03-12 RX ADMIN — IPRATROPIUM BROMIDE AND ALBUTEROL SULFATE 3 ML: .5; 2.5 SOLUTION RESPIRATORY (INHALATION) at 18:38

## 2025-03-12 ASSESSMENT — COGNITIVE AND FUNCTIONAL STATUS - GENERAL
MOBILITY SCORE: 24
SUGGESTED CMS G CODE MODIFIER MOBILITY: CH
DAILY ACTIVITIY SCORE: 24
SUGGESTED CMS G CODE MODIFIER DAILY ACTIVITY: CH

## 2025-03-12 ASSESSMENT — ENCOUNTER SYMPTOMS
COUGH: 1
WHEEZING: 1
FEVER: 1
SHORTNESS OF BREATH: 1
SORE THROAT: 1

## 2025-03-12 ASSESSMENT — PAIN DESCRIPTION - PAIN TYPE
TYPE: ACUTE PAIN
TYPE: ACUTE PAIN

## 2025-03-12 ASSESSMENT — COPD QUESTIONNAIRES: COPD: 0

## 2025-03-12 NOTE — PROGRESS NOTES
Subjective     Fernando Grissom is a 74 y.o. male who presents with Cough (SOB x 6 days )            Cough  This is a new problem. The current episode started in the past 7 days. The problem has been gradually worsening. The problem occurs constantly. The cough is Productive of purulent sputum. Associated symptoms include ear pain, a fever, a sore throat, shortness of breath and wheezing. Pertinent negatives include no ear congestion or nasal congestion. Treatments tried: Mucinex. The treatment provided mild relief. There is no history of asthma, COPD or emphysema.       Review of Systems   Constitutional:  Positive for fever.   HENT:  Positive for ear pain and sore throat.    Respiratory:  Positive for cough, sputum production, shortness of breath and wheezing.    Cardiovascular: Negative.               Objective     BP (!) 162/78   Pulse 69   Temp 37.3 °C (99.1 °F) (Temporal)   Resp 20   Ht 1.829 m (6')   Wt 85.7 kg (189 lb)   SpO2 (!) 87%   BMI 25.63 kg/m²      Physical Exam  Constitutional:       Appearance: Normal appearance.   HENT:      Head: Normocephalic and atraumatic.      Nose: Nose normal. No congestion or rhinorrhea.      Mouth/Throat:      Mouth: Mucous membranes are moist.      Pharynx: No posterior oropharyngeal erythema.   Eyes:      Extraocular Movements: Extraocular movements intact.      Pupils: Pupils are equal, round, and reactive to light.   Cardiovascular:      Rate and Rhythm: Normal rate and regular rhythm.   Pulmonary:      Effort: Tachypnea and respiratory distress present.      Breath sounds: No stridor. Wheezing present. No rhonchi.   Musculoskeletal:         General: Normal range of motion.      Cervical back: Normal range of motion and neck supple.   Lymphadenopathy:      Cervical: No cervical adenopathy.   Skin:     General: Skin is warm and dry.   Neurological:      General: No focal deficit present.      Mental Status: He is alert and oriented to person, place, and  time.              Assessment & Plan  Wheezing    Orders:    albuterol (PROAIR HFA) 108 (90 Base) MCG/ACT Aero Soln inhalation aerosol; Inhale 2 Puffs every 6 hours as needed (cough/wheeze).    ipratropium-albuterol (DUONEB) nebulizer solution    POCT CEPHEID COV-2, FLU A/B, RSV - PCR    Shortness of breath    Orders:    ipratropium-albuterol (DUONEB) nebulizer solution    POCT CEPHEID COV-2, FLU A/B, RSV - PCR    Results for orders placed or performed in visit on 03/12/25   POCT CEPHEID COV-2, FLU A/B, RSV - PCR    Collection Time: 03/12/25  5:20 PM   Result Value Ref Range    SARS-CoV-2 by PCR Negative Negative, Invalid    Influenza virus A RNA Negative Negative, Invalid    Influenza virus B, PCR Negative Negative, Invalid    RSV, PCR Negative Negative, Invalid      Hypoxia    Orders:    ipratropium-albuterol (DUONEB) nebulizer solution    POCT CEPHEID COV-2, FLU A/B, RSV - PCR       This is an acute condition.  Previous visits, pmhx, medication, and VS reviewed. Patient hypoxic upon visit, 87% on room air.  2L 94%. Patient tachypneic with audible wheezing.  Lung sounds wheezing throughout.   DuoNeb x1 in clinic.  Given patient presentation, and no significant pmhx, I encouraged patient and wife, who is present at time of visit, to refer to higher level of care for more efficient and timely work up in the hospital setting, as we are limited in urgent care.  They are agreeable to [facility] transport, which is preferred given oxygen resource.    Patient's daughter who is a nurse at [facility], was updated on the phone of patient's request to be seen at [facility].  Transfer center notified.  Patient transported out of  via gurney to ambulance.

## 2025-03-13 ENCOUNTER — PHARMACY VISIT (OUTPATIENT)
Dept: PHARMACY | Facility: MEDICAL CENTER | Age: 75
End: 2025-03-13
Payer: COMMERCIAL

## 2025-03-13 VITALS
BODY MASS INDEX: 25.6 KG/M2 | TEMPERATURE: 98.1 F | DIASTOLIC BLOOD PRESSURE: 60 MMHG | OXYGEN SATURATION: 90 % | HEIGHT: 72 IN | RESPIRATION RATE: 16 BRPM | HEART RATE: 65 BPM | WEIGHT: 189 LBS | SYSTOLIC BLOOD PRESSURE: 128 MMHG

## 2025-03-13 LAB
ANION GAP SERPL CALC-SCNC: 18 MMOL/L (ref 7–16)
BASOPHILS # BLD AUTO: 0.2 % (ref 0–1.8)
BASOPHILS # BLD: 0.01 K/UL (ref 0–0.12)
BUN SERPL-MCNC: 15 MG/DL (ref 8–22)
CALCIUM SERPL-MCNC: 9.1 MG/DL (ref 8.5–10.5)
CHLORIDE SERPL-SCNC: 98 MMOL/L (ref 96–112)
CO2 SERPL-SCNC: 21 MMOL/L (ref 20–33)
CREAT SERPL-MCNC: 0.87 MG/DL (ref 0.5–1.4)
EOSINOPHIL # BLD AUTO: 0 K/UL (ref 0–0.51)
EOSINOPHIL NFR BLD: 0 % (ref 0–6.9)
ERYTHROCYTE [DISTWIDTH] IN BLOOD BY AUTOMATED COUNT: 44.9 FL (ref 35.9–50)
EST. AVERAGE GLUCOSE BLD GHB EST-MCNC: 137 MG/DL
GFR SERPLBLD CREATININE-BSD FMLA CKD-EPI: 91 ML/MIN/1.73 M 2
GLUCOSE SERPL-MCNC: 233 MG/DL (ref 65–99)
HBA1C MFR BLD: 6.4 % (ref 4–5.6)
HCT VFR BLD AUTO: 45.3 % (ref 42–52)
HGB BLD-MCNC: 14.9 G/DL (ref 14–18)
IMM GRANULOCYTES # BLD AUTO: 0.03 K/UL (ref 0–0.11)
IMM GRANULOCYTES NFR BLD AUTO: 0.6 % (ref 0–0.9)
LYMPHOCYTES # BLD AUTO: 0.95 K/UL (ref 1–4.8)
LYMPHOCYTES NFR BLD: 18.2 % (ref 22–41)
MCH RBC QN AUTO: 30.3 PG (ref 27–33)
MCHC RBC AUTO-ENTMCNC: 32.9 G/DL (ref 32.3–36.5)
MCV RBC AUTO: 92.1 FL (ref 81.4–97.8)
MONOCYTES # BLD AUTO: 0.2 K/UL (ref 0–0.85)
MONOCYTES NFR BLD AUTO: 3.8 % (ref 0–13.4)
NEUTROPHILS # BLD AUTO: 4.04 K/UL (ref 1.82–7.42)
NEUTROPHILS NFR BLD: 77.2 % (ref 44–72)
NRBC # BLD AUTO: 0 K/UL
NRBC BLD-RTO: 0 /100 WBC (ref 0–0.2)
PLATELET # BLD AUTO: 159 K/UL (ref 164–446)
PMV BLD AUTO: 10.6 FL (ref 9–12.9)
POTASSIUM SERPL-SCNC: 3.6 MMOL/L (ref 3.6–5.5)
RBC # BLD AUTO: 4.92 M/UL (ref 4.7–6.1)
SODIUM SERPL-SCNC: 137 MMOL/L (ref 135–145)
WBC # BLD AUTO: 5.2 K/UL (ref 4.8–10.8)

## 2025-03-13 PROCEDURE — 99999 PR NO CHARGE: CPT | Mod: AI,GC | Performed by: FAMILY MEDICINE

## 2025-03-13 PROCEDURE — 700102 HCHG RX REV CODE 250 W/ 637 OVERRIDE(OP): Performed by: BEHAVIOR ANALYST

## 2025-03-13 PROCEDURE — RXMED WILLOW AMBULATORY MEDICATION CHARGE

## 2025-03-13 PROCEDURE — 36415 COLL VENOUS BLD VENIPUNCTURE: CPT

## 2025-03-13 PROCEDURE — 94640 AIRWAY INHALATION TREATMENT: CPT

## 2025-03-13 PROCEDURE — 700111 HCHG RX REV CODE 636 W/ 250 OVERRIDE (IP): Performed by: BEHAVIOR ANALYST

## 2025-03-13 PROCEDURE — 83036 HEMOGLOBIN GLYCOSYLATED A1C: CPT

## 2025-03-13 PROCEDURE — 85025 COMPLETE CBC W/AUTO DIFF WBC: CPT

## 2025-03-13 PROCEDURE — A9270 NON-COVERED ITEM OR SERVICE: HCPCS | Performed by: BEHAVIOR ANALYST

## 2025-03-13 PROCEDURE — 700101 HCHG RX REV CODE 250: Performed by: BEHAVIOR ANALYST

## 2025-03-13 PROCEDURE — 80048 BASIC METABOLIC PNL TOTAL CA: CPT

## 2025-03-13 PROCEDURE — 99238 HOSP IP/OBS DSCHRG MGMT 30/<: CPT | Mod: GC | Performed by: FAMILY MEDICINE

## 2025-03-13 RX ORDER — PREDNISONE 50 MG/1
50 TABLET ORAL DAILY
Qty: 2 TABLET | Refills: 0 | Status: ON HOLD | OUTPATIENT
Start: 2025-03-14 | End: 2025-03-17

## 2025-03-13 RX ORDER — GUAIFENESIN 1200 MG/1
1200 TABLET, EXTENDED RELEASE ORAL EVERY 12 HOURS
Qty: 28 TABLET | Refills: 3 | Status: SHIPPED | OUTPATIENT
Start: 2025-03-13

## 2025-03-13 RX ORDER — PREDNISONE 10 MG/1
TABLET ORAL
Qty: 21 TABLET | Refills: 0 | Status: ON HOLD | OUTPATIENT
Start: 2025-03-16 | End: 2025-03-17

## 2025-03-13 RX ORDER — AZITHROMYCIN 500 MG/1
500 TABLET, FILM COATED ORAL DAILY
Qty: 1 TABLET | Refills: 0 | Status: ON HOLD | OUTPATIENT
Start: 2025-03-14 | End: 2025-03-17

## 2025-03-13 RX ORDER — GUAIFENESIN 600 MG/1
1200 TABLET, EXTENDED RELEASE ORAL EVERY 12 HOURS
Status: DISCONTINUED | OUTPATIENT
Start: 2025-03-13 | End: 2025-03-13 | Stop reason: HOSPADM

## 2025-03-13 RX ORDER — ACETAMINOPHEN 325 MG/1
650 TABLET ORAL EVERY 4 HOURS PRN
Qty: 30 TABLET | Refills: 0 | Status: SHIPPED | OUTPATIENT
Start: 2025-03-13

## 2025-03-13 RX ORDER — IPRATROPIUM BROMIDE AND ALBUTEROL SULFATE 2.5; .5 MG/3ML; MG/3ML
3 SOLUTION RESPIRATORY (INHALATION)
Status: DISCONTINUED | OUTPATIENT
Start: 2025-03-13 | End: 2025-03-13 | Stop reason: HOSPADM

## 2025-03-13 RX ORDER — ALBUTEROL SULFATE 90 UG/1
2 INHALANT RESPIRATORY (INHALATION) EVERY 4 HOURS
Qty: 34 G | Refills: 3 | Status: SHIPPED | OUTPATIENT
Start: 2025-03-13

## 2025-03-13 RX ADMIN — GUAIFENESIN 1200 MG: 600 TABLET, EXTENDED RELEASE ORAL at 06:24

## 2025-03-13 RX ADMIN — PREDNISONE 50 MG: 50 TABLET ORAL at 06:24

## 2025-03-13 RX ADMIN — IPRATROPIUM BROMIDE AND ALBUTEROL SULFATE 3 ML: .5; 2.5 SOLUTION RESPIRATORY (INHALATION) at 00:55

## 2025-03-13 RX ADMIN — ALBUTEROL SULFATE 2 PUFF: 90 AEROSOL, METERED RESPIRATORY (INHALATION) at 03:26

## 2025-03-13 RX ADMIN — ALBUTEROL SULFATE 2 PUFF: 90 AEROSOL, METERED RESPIRATORY (INHALATION) at 10:46

## 2025-03-13 RX ADMIN — IPRATROPIUM BROMIDE AND ALBUTEROL SULFATE 3 ML: .5; 2.5 SOLUTION RESPIRATORY (INHALATION) at 04:04

## 2025-03-13 RX ADMIN — MOMETASONE FUROATE AND FORMOTEROL FUMARATE DIHYDRATE 2 PUFF: 200; 5 AEROSOL RESPIRATORY (INHALATION) at 04:14

## 2025-03-13 RX ADMIN — ALBUTEROL SULFATE 2 PUFF: 90 AEROSOL, METERED RESPIRATORY (INHALATION) at 06:26

## 2025-03-13 RX ADMIN — AZITHROMYCIN 500 MG: 250 TABLET, FILM COATED ORAL at 12:13

## 2025-03-13 SDOH — ECONOMIC STABILITY: TRANSPORTATION INSECURITY
IN THE PAST 12 MONTHS, HAS THE LACK OF TRANSPORTATION KEPT YOU FROM MEDICAL APPOINTMENTS OR FROM GETTING MEDICATIONS?: NO

## 2025-03-13 SDOH — ECONOMIC STABILITY: TRANSPORTATION INSECURITY
IN THE PAST 12 MONTHS, HAS LACK OF RELIABLE TRANSPORTATION KEPT YOU FROM MEDICAL APPOINTMENTS, MEETINGS, WORK OR FROM GETTING THINGS NEEDED FOR DAILY LIVING?: NO

## 2025-03-13 ASSESSMENT — LIFESTYLE VARIABLES
HAVE PEOPLE ANNOYED YOU BY CRITICIZING YOUR DRINKING: NO
TOTAL SCORE: 0
DOES PATIENT WANT TO STOP DRINKING: NO
TOTAL SCORE: 0
ALCOHOL_USE: NO
TOTAL SCORE: 0
CONSUMPTION TOTAL: NEGATIVE
AVERAGE NUMBER OF DAYS PER WEEK YOU HAVE A DRINK CONTAINING ALCOHOL: 0
EVER HAD A DRINK FIRST THING IN THE MORNING TO STEADY YOUR NERVES TO GET RID OF A HANGOVER: NO
HAVE YOU EVER FELT YOU SHOULD CUT DOWN ON YOUR DRINKING: NO
HOW MANY TIMES IN THE PAST YEAR HAVE YOU HAD 5 OR MORE DRINKS IN A DAY: 0
EVER FELT BAD OR GUILTY ABOUT YOUR DRINKING: NO
ON A TYPICAL DAY WHEN YOU DRINK ALCOHOL HOW MANY DRINKS DO YOU HAVE: 0

## 2025-03-13 ASSESSMENT — PAIN DESCRIPTION - PAIN TYPE
TYPE: ACUTE PAIN

## 2025-03-13 ASSESSMENT — SOCIAL DETERMINANTS OF HEALTH (SDOH)
WITHIN THE LAST YEAR, HAVE YOU BEEN KICKED, HIT, SLAPPED, OR OTHERWISE PHYSICALLY HURT BY YOUR PARTNER OR EX-PARTNER?: NO
WITHIN THE LAST YEAR, HAVE TO BEEN RAPED OR FORCED TO HAVE ANY KIND OF SEXUAL ACTIVITY BY YOUR PARTNER OR EX-PARTNER?: NO
WITHIN THE LAST YEAR, HAVE YOU BEEN AFRAID OF YOUR PARTNER OR EX-PARTNER?: NO
WITHIN THE PAST 12 MONTHS, THE FOOD YOU BOUGHT JUST DIDN'T LAST AND YOU DIDN'T HAVE MONEY TO GET MORE: NEVER TRUE
WITHIN THE PAST 12 MONTHS, YOU WORRIED THAT YOUR FOOD WOULD RUN OUT BEFORE YOU GOT THE MONEY TO BUY MORE: NEVER TRUE
WITHIN THE LAST YEAR, HAVE YOU BEEN HUMILIATED OR EMOTIONALLY ABUSED IN OTHER WAYS BY YOUR PARTNER OR EX-PARTNER?: NO
IN THE PAST 12 MONTHS, HAS THE ELECTRIC, GAS, OIL, OR WATER COMPANY THREATENED TO SHUT OFF SERVICE IN YOUR HOME?: NO

## 2025-03-13 ASSESSMENT — COPD QUESTIONNAIRES
DO YOU EVER COUGH UP ANY MUCUS OR PHLEGM?: NO/ONLY WITH OCCASIONAL COLDS OR INFECTIONS
COPD SCREENING SCORE: 4
DURING THE PAST 4 WEEKS HOW MUCH DID YOU FEEL SHORT OF BREATH: SOME OF THE TIME
HAVE YOU SMOKED AT LEAST 100 CIGARETTES IN YOUR ENTIRE LIFE: NO/DON'T KNOW

## 2025-03-13 ASSESSMENT — PATIENT HEALTH QUESTIONNAIRE - PHQ9
1. LITTLE INTEREST OR PLEASURE IN DOING THINGS: NOT AT ALL
2. FEELING DOWN, DEPRESSED, IRRITABLE, OR HOPELESS: NOT AT ALL
SUM OF ALL RESPONSES TO PHQ9 QUESTIONS 1 AND 2: 0

## 2025-03-13 NOTE — ED PROVIDER NOTES
ED Provider Note    CHIEF COMPLAINT  Chief Complaint   Patient presents with    Shortness of Breath     Pt BIB EMS from [facility] for above complaint. Pt report worsening SOB x3days and cough. Pt was given duoneb at  and albuterol by EMS PTA. Pt 86% on RA.        EXTERNAL RECORDS REVIEWED  Outpatient Notes patient saw his family practitioner 5/9/2024 for follow-up of a respiratory tract infection, wheezing and was prescribed doxycycline, dexamethasone, albuterol.    HPI/ROS  LIMITATION TO HISTORY   Select: Language Zimbabwean,  Used   OUTSIDE HISTORIAN(S):  Family including wife and daughter assisting with history.  Daughter is a licensed     Fernando Grissom is a 74 y.o. male who presents to the emergency department for evaluation of shortness of breath but the patient states that he has had worsening shortness of breath particularly with exertion over the last 2 to 3 days.  This is in the setting of 3 weeks of a cough.  Initially the cough was dry and productive of yellow sputum.  The patient briefly got better and then the cough returned over the last week to week and a half now much more severe with extensive dark green sputum.  Patient denies any chest pain or pressure, fevers or chills, nausea vomiting or diarrhea.  He states that he feels generally tired and has a decreased appetite.  Has had no leg swelling.  He denies history of asthma or COPD but has used albuterol inhaler in the past with good success.  He notes he has a history of pneumonia.    PAST MEDICAL HISTORY   has a past medical history of High cholesterol, History of BPH, and Lymphoma (HCC) (2010).    SURGICAL HISTORY   has a past surgical history that includes tonsillectomy (9/17/2010); turp-vapor (2017); other orthopedic surgery; bone marrow aspiration (10/26/2010); bone marrow biopsy, ndl/trocar (10/26/2010); mass excision general (Left, 12/26/2019); and lesion excision ortho (Left,  12/26/2019).    FAMILY HISTORY  No family history on file.    SOCIAL HISTORY  Social History     Tobacco Use    Smoking status: Never    Smokeless tobacco: Never   Vaping Use    Vaping status: Never Used   Substance and Sexual Activity    Alcohol use: No    Drug use: No    Sexual activity: Not on file       CURRENT MEDICATIONS  Home Medications       Reviewed by Beny Minaya (Pharmacy Tech) on 03/12/25 at 1955  Med List Status: Complete     Medication Last Dose Status   Kichigawj-WDM-KW-APAP (DERECK-SELTZER PLUS COLD & FLU PO) 3/12/2025 Active   PSEUDOEPHEDRINE HCL PO 3/12/2025 Active                  Audit from Redirected Encounters    **Home medications have not yet been reviewed for this encounter**         ALLERGIES  No Known Allergies    PHYSICAL EXAM  VITAL SIGNS: /64   Pulse 63   Temp 36.7 °C (98.1 °F) (Oral)   Resp 20   Ht 1.829 m (6')   Wt 85.7 kg (189 lb)   SpO2 94%   BMI 25.63 kg/m²    Constitutional: No acute distress, resting comfortably in the [facility] wearing nasal cannula  HEENT: Atraumatic, normocephalic, pupils are equal round reactive to light, nose normal  Neck: Supple, no JVD, no tracheal deviation  Cardiovascular: Regular rate and rhythm, no murmur, rub or gallop, 2+ pulses peripherally x4  Thorax & Lungs: No respiratory distress, diffuse wheezing and rhonchorous breath sounds throughout  GI: Soft, non-distended, non-tender, no rebound  Skin: Warm, dry, no acute rash or lesion  Musculoskeletal: Moving all extremities, no acute deformity, no edema, no tenderness  Neurologic: A&Ox3, at baseline mentation  Psychiatric: Appropriate affect for situation at this time        EKG/LABS  Labs Reviewed   CBC WITH DIFFERENTIAL - Abnormal; Notable for the following components:       Result Value    Platelet Count 146 (*)     Monocytes 16.80 (*)     Monos (Absolute) 1.18 (*)     All other components within normal limits   COMP METABOLIC PANEL - Abnormal; Notable for the following components:     Glucose 108 (*)     Globulin 3.6 (*)     All other components within normal limits   PROBRAIN NATRIURETIC PEPTIDE, NT - Abnormal; Notable for the following components:    NT-proBNP 133 (*)     All other components within normal limits   TROPONIN   PROCALCITONIN   ESTIMATED GFR   CBC WITH DIFFERENTIAL   BASIC METABOLIC PANEL   POCT COV-2, FLU A/B, RSV BY PCR   POC COV-2, FLU A/B, RSV BY PCR     Results for orders placed or performed during the hospital encounter of 25   EKG   Result Value Ref Range    Report       [facility] Emergency Dept.    Test Date:  2025  Pt Name:    NANCY CENTENO         Department: ER  MRN:        [id number]                      Room:        11  Gender:     Male                         Technician: [id number]  :        1950                   Requested By:ER TRIAGE PROTOCOL  Order #:    [id number]                    Reading MD:    Measurements  Intervals                                Axis  Rate:       84                           P:          56  IL:         148                          QRS:        74  QRSD:       87                           T:          51  QT:         363  QTc:        430    Interpretive Statements  Sinus rhythm  Atrial premature complex  Compared to ECG 2019 11:28:18  Atrial premature complex(es) now present  Sinus bradycardia no longer present         I have independently interpreted this EKG    RADIOLOGY/PROCEDURES   I have independently interpreted the diagnostic imaging associated with this visit and am waiting the final reading from the radiologist.   My preliminary interpretation is as follows: No focal pneumonia appreciated    Radiologist interpretation:  DX-CHEST-PORTABLE (1 VIEW)   Final Result         No acute cardiac or pulmonary abnormality is identified.          COURSE & MEDICAL DECISION MAKING    ASSESSMENT, COURSE AND PLAN  Care Narrative:     Patient presents from urgent care for further evaluation of diffuse  wheezing and hypoxia in the setting of a prolonged and now heavily productive cough.  No formal diagnosis of asthma or COPD but has responded to beta agonists in the past.  Patient diminished and wheezy on examination, newly hypoxic necessitating 2 L supplemental oxygen.  Suspect reactive airway disease in the setting potentially of a viral illness versus bacterial pneumonia.  Workup undertaken demonstrating no significant leukocytosis or left shift, SHELLIE or electrolyte abnormality, evidence of new onset heart failure.  Viral testing negative.  Normal procalcitonin further argues against serious bacterial illness and chest x-ray does not demonstrate any focal pneumonia.  Patient was treated with a DuoNeb and Solu-Medrol and empiric antibiotic therapy initiated with ceftriaxone and azithromycin given his clinical history and hypoxia.  Will admit the patient for further observation and treatment.  Case discussed with [facility] family medicine resident who accepts for admission.      ADDITIONAL PROBLEMS MANAGED  Reactive airway disease    DISPOSITION AND DISCUSSIONS  I have discussed management of the patient with the following physicians and MARILU's: [facility] family medicine team    Decision tools and prescription drugs considered including, but not limited to: Antibiotics ceftriaxone and azithromycin .    FINAL DIAGNOSIS  1. Productive cough    2. Acute hypoxic respiratory failure (HCC)    3. Reactive airway disease with acute exacerbation, unspecified asthma severity, unspecified whether persistent         Electronically signed by: Cyril Hahn M.D., 3/12/2025 5:45 PM

## 2025-03-13 NOTE — H&P
[facility] FAMILY MEDICINE HISTORY AND PHYSICAL     PATIENT ID:  NAME:  Fernando Grissom  MRN:               [id number]  YOB: 1950    Date of Admission: 3/12/2025     Attending: Admitted overnight to Dr. Romero, patient to be seen in the morning by Dr. Ferris    Resident: Patrick Juares MD     Primary Care Physician: Patrick Juares M.D.    CC: Hypoxic      HPI: Fernando Grissom is a 74 y.o. male with no significant past medical history who presents due to hypoxia.  Patient is accompanied by daughter who is supervising nurse at [facility].  Patient is Tajik-speaking, daughter provides most of history.  Patient apparently was well until a few weeks ago when he experienced a viral URI that resolved, in the past few days his wife experienced a URI and it is believed that he contracted it in the past few days and started having fevers cough with green thick rubbery phlegm, chills, and wheezing.  Patient daughter states that annually he gets these viral URIs that become complicated with bronchospasms, wheezing, pneumonia.  Patient does not have a formal diagnosis of asthma or COPD or any other lung disease.      He was taken to an urgent care and was found to be hypoxic and directed to the ED.  He is requiring 2 L, his wheezing is improved significantly with DuoNeb treatments.  He does not have myalgias, N/V/D, chest pain, lightheadedness, syncope.    He is a never smoker, drinks alcohol mild to moderately, no drug history.  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      ERCourse:  Given azithromycin 500 mg, ceftriaxone 2000 mg, DuoNebs x 1, Solu-Medrol; procalcitonin, chest x-ray, CBC/CMP all unremarkable at this time.    REVIEW OF SYSTEMS:   Ten systems reviewed and were negative except as noted in the HPI.                PAST MEDICAL HISTORY:  Past Medical History:   Diagnosis Date    High cholesterol     History of BPH     S/P TURP    Lymphoma (HCC) 2010       PAST  SURGICAL HISTORY:  Past Surgical History:   Procedure Laterality Date    MASS EXCISION GENERAL Left 12/26/2019    Procedure: EXCISION, MASS, GENERAL-ARM;  Surgeon: Dmitry Ye M.D.;  Location: SURGERY SAME DAY [facility];  Service: General    LESION EXCISION ORTHO Left 12/26/2019    Procedure: EXCISION, LESION;  Surgeon: Dmitry Ye M.D.;  Location: SURGERY SAME DAY [facility];  Service: General    TURP-VAPOR  2017    BONE MARROW ASPIRATION  10/26/2010    Performed by JOAQUIM CRUZ at ENDOSCOPY [facility]    BONE MARROW BIOPSY, NDL/TROCAR  10/26/2010    Performed by JOAQUIM CRUZ at ENDOSCOPY [facility]    TONSILLECTOMY  9/17/2010    Performed by TESS PETTIT at SURGERY SAME DAY [facility]    OTHER ORTHOPEDIC SURGERY         FAMILY HISTORY:  No family history on file.    SOCIAL HISTORY:   See HPI    DIET:   Orders Placed This Encounter   Procedures    Diet Order Diet: Regular     Standing Status:   Standing     Number of Occurrences:   1     Diet::   Regular [1]       ALLERGIES:  No Known Allergies    OUTPATIENT MEDICATIONS:    Current Facility-Administered Medications:     guaiFENesin ER (Mucinex) tablet 1,200 mg, 1,200 mg, Oral, Q12HRS, Patrick Juares M.D.    mometasone-formoterol (Dulera) 200-5 MCG/ACT inhaler 2 Puff, 2 Puff, Inhalation, BID (RT), Patrick Juares M.D.    ipratropium-albuterol (DUONEB) nebulizer solution, 3 mL, Nebulization, Q4HRS (RT), Patrick Juares M.D.    Respiratory Therapy Consult, , Nebulization, Continuous RT, Patrick Juares M.D.    enoxaparin (Lovenox) inj 40 mg, 40 mg, Subcutaneous, DAILY AT 1800, Patrick Juares M.D.    predniSONE (Deltasone) tablet 50 mg, 50 mg, Oral, DAILY, Patrick Juares M.D.    azithromycin (Zithromax) tablet 500 mg, 500 mg, Oral, DAILY, Patrick Juares M.D.    albuterol inhaler 2 Puff, 2 Puff, Inhalation, Q4HRS (RT), Patrick Juares M.D., 2 Puff at 03/13/25 0326    acetaminophen (Tylenol) tablet 650 mg, 650  mg, Oral, Q4HRS PRN, Patrick Juares M.D., 650 mg at 25 2214    PHYSICAL EXAM:  Vitals:    25 2339 25 0055 25 0100 25 0325   BP: 128/64   121/64   Pulse: 63   63   Resp: 20   19   Temp: 36.7 °C (98.1 °F)   36.5 °C (97.7 °F)   TempSrc: Oral   Temporal   SpO2: 92% 94% 92% 92%   Weight:       Height:       , Temp (24hrs), Av.3 °C (99.1 °F), Min:36.5 °C (97.7 °F), Max:38.3 °C (101 °F)  , Pulse Oximetry: 92 %, O2 (LPM): 0.5, O2 Delivery Device: Nasal Cannula    General: Pt resting in NAD, cooperative   Skin:  Pink, warm and dry.  No rashes  HEENT: NC/AT. PERRL. EOMI. MMM. No nasal discharge. Oropharynx nonerythematous without exudate/plaques  Neck:  Supple without lymphadenopathy or rigidity.   Lungs: Poor aeration, significant wheezing bilaterally  Cardiovascular:  S1/S2 RRR without murmurs  Abdomen:  Abdomen is soft, nontender, nondistended. +BS. No masses noted.  Extremities:  Full range of motion. No gross deformities noted. 2+ pulses in all extremities. No edema   Spine:  Straight without vertebral anomalies.  CNS:  Muscle tone is normal. No gross focal neurologic deficits      LAB TESTS:   Recent Labs     25  1728   WBC 7.0   RBC 5.13   HEMOGLOBIN 15.9   HEMATOCRIT 48.2   MCV 94.0   MCH 31.0   RDW 45.0   PLATELETCT 146*   MPV 10.5   NEUTSPOLYS 51.70   LYMPHOCYTES 29.20   MONOCYTES 16.80*   EOSINOPHILS 1.60   BASOPHILS 0.40         Recent Labs     25  1728   SODIUM 138   POTASSIUM 3.9   CHLORIDE 100   CO2 26   BUN 10   CREATININE 0.86   CALCIUM 9.3   ALBUMIN 4.3       CULTURES:   Results       ** No results found for the last 168 hours. **            IMAGES:  DX-CHEST-PORTABLE (1 VIEW)   Final Result         No acute cardiac or pulmonary abnormality is identified.          CONSULTS:   N/A    ASSESSMENT/PLAN:   74 y.o. male admitted for acute hypoxic respiratory failure secondary to underlying asthma versus COPD     * Hypoxia- (present on admission)  Assessment &  Plan  History of recurring annual URI complicated by wheezing and pneumonia; checks x-ray, Pro-Dexter, CBC, CMP all unremarkable; I agree with ED physician that patient likely has under lying asthma versus COPD, I am more inclined towards asthma as patient has been coughing up what is described thick hard mucus (per description resembles mucous plugs), improvement with bronchodilators, patient is a never smoker and has not lived in the large city most of his life therefore staring away from the COPD diagnosis; however [place] and had exposure to some degree.    At this time I am inclined to treat this as a asthma/COPD exacerbation with finishing course of steroids started in the ED, and continuing 3 days of azithromycin; I will discontinue ceftriaxone as chest x-ray does not show any focal bacterial like pneumonia and procalc and WBC are within normal limits; I do believe patient's fever is likely due to a viral URI at this time.    -Admit to hospital floor  -Continue steroid course for total of 5 days  -Continue azithromycin for total of 3 days  -Discontinue ceftriaxone at this time as I have low suspicion for focal pneumonia  -DuoNebs initially was as needed, however I will switch it to scheduled given that patient continues to have expiratory wheezes every few hours after DuoNebs  -RT consult  -Continuous pulse oximetry  -Continue oxygen supplementation, wean as tolerated  -Discussed with pharmacy to start Dulera at a high dose to get symptom control, can potentially titrate down to a lower dose upon discharge once patient is more stable  -Order mucolytic  -Instruct I-S use            Lines/Tubes: PIV  Lasix: N/A  Diet: Regular  Abx: Azithromycin; ceftriaxone given in ED, discontinued afterwards  DVT prophylaxis: enoxaparin  Code Status: Full code    Dispo: Admit to [facility] hospital service

## 2025-03-13 NOTE — ED NOTES
Medication history reviewed with patient & Family at bedside.   Med rec is complete  Allergies reviewed.     Patient has not had any outpatient antibiotics in the last 30 days.   Anticoagulants Dispense history available in EPIC: Yes    Beny Minaya

## 2025-03-13 NOTE — ED TRIAGE NOTES
Chief Complaint   Patient presents with    Shortness of Breath     Pt BIB EMS from [facility] for above complaint. Pt report worsening SOB x3days and cough. Pt was given duoneb at  and albuterol by EMS PTA. Pt 86% on RA.

## 2025-03-13 NOTE — PROGRESS NOTES
4 Eyes Skin Assessment Completed by YASMIN Little and YASMIN Hurtado.    Head WDL  Ears red/blanching  Nose WDL  Mouth WDL  Neck WDL  Breast/Chest WDL  Shoulder Blades WDL  Spine WDL  (R) Arm/Elbow/Hand WDL  (L) Arm/Elbow/Hand WDL  Abdomen WDL  Groin WDL  Scrotum/Coccyx/Buttocks WDL  (R) Leg WDL  (L) Leg WDL  (R) Heel/Foot/Toe WDL  (L) Heel/Foot/Toe WDL          Devices In Places Blood Pressure Cuff, Pulse Ox, and Nasal Cannula      Interventions In Place Gray Ear Foams and Pillows    Possible Skin Injury No    Pictures Uploaded Into Epic N/A  Wound Consult Placed N/A  RN Wound Prevention Protocol Ordered No

## 2025-03-13 NOTE — CARE PLAN
The patient is Stable - Low risk of patient condition declining or worsening    Shift Goals  Clinical Goals: monitor O2 needs  Patient Goals: rest  Family Goals: update on POC    Progress made toward(s) clinical / shift goals:  Pt medicated per MAR, resting. Weaning O2 as possible, pt on 0.5L at 92%. IS at bedside. Pt denies pain. Updated pt and family on POC.      Problem: Knowledge Deficit - Standard  Goal: Patient and family/care givers will demonstrate understanding of plan of care, disease process/condition, diagnostic tests and medications  Outcome: Progressing       Patient is not progressing towards the following goals:

## 2025-03-13 NOTE — ASSESSMENT & PLAN NOTE
History of recurring annual URI complicated by wheezing and pneumonia; checks x-ray, Pro-Dexter, CBC, CMP all unremarkable; I agree with ED physician that patient likely has under lying asthma versus COPD, I am more inclined towards asthma as patient has been coughing up what is described thick hard mucus (per description resembles mucous plugs), improvement with bronchodilators, patient is a never smoker and has not lived in the large city most of his life therefore staring away from the COPD diagnosis; however [place] of his [place] and had exposure to some degree.    At this time I am inclined to treat this as a asthma/COPD exacerbation with finishing course of steroids started in the ED, and continuing 3 days of azithromycin; I will discontinue ceftriaxone as chest x-ray does not show any focal bacterial like pneumonia and procalc and WBC are within normal limits; I do believe patient's fever is likely due to a viral URI at this time.    -Admit to hospital floor  -Continue steroid course for total of 5 days  -Continue azithromycin for total of 3 days  -Discontinue ceftriaxone at this time as I have low suspicion for focal pneumonia  -DuoNebs initially was as needed, however I will switch it to scheduled given that patient continues to have expiratory wheezes every few hours after DuoNebs  -RT consult  -Continuous pulse oximetry  -Continue oxygen supplementation, wean as tolerated  -Discussed with pharmacy to start Dulera at a high dose to get symptom control, can potentially titrate down to a lower dose upon discharge once patient is more stable  -Order mucolytic  -Instruct I-S use

## 2025-03-13 NOTE — PROGRESS NOTES
Virtual Nurse rounding complete using  services ID #[id number].    Round Needs: No needs at this time.

## 2025-03-13 NOTE — PROGRESS NOTES
Patient discharged home with family accompanying. Discharge paperwork and meds to beds given to family. PIV removed. Patient states he has all belongings. All needs met at this time.

## 2025-03-13 NOTE — ED NOTES
Transport at bedside for pt transfer, pt on 2 L NC, A&Ox4 with family present. All pt belongings accounted for

## 2025-03-13 NOTE — PROGRESS NOTES
Pt admitted to room T410 via transport in [facility] from ED at 2020.  Pt pain reported at 0 on a scale of 0-10. Oriented to room call light and smoking policy.  Reviewed plan of care (equipment, incentive spirometer, sequential compression devices, medications, activity, diet, fall precautions, skin care, and pain) with patient and family. Welcome packet given and reviewed with pt and family , all questions answered. Education provided on oral hygiene program.     /67   Pulse 75   Temp 36.8 °C (98.2 °F) (Oral)   Resp 20   Ht 1.829 m (6')   Wt 85.7 kg (189 lb)   SpO2 92%   BMI 25.63 kg/m²        AA&Ox4. Denies CP/SOB.  See 2 RN skin note  Tolerating regular diet. Denies N/V.  + void. + BM. Last BM PTA  Pt ambulates SBA/up self  Nasal cannula in palce  All needs met at this time. Call light within reach. Pt calls appropriately. Bed low and locked, non skid socks in place. Hourly rounding in place.

## 2025-03-13 NOTE — ED NOTES
Bedside report received from YASMIN Magaña. Pt alert&Ox4 with family at bedside. Resp normal/unlabored on 2L NC. Bed side rails up/in low position. Pt updated to POC and all questions answered.     Call light in reach

## 2025-03-13 NOTE — DISCHARGE PLANNING
Case Management Discharge Planning    Admission Date: 3/12/2025  GMLOS: 3.5  ALOS: 1    6-Clicks ADL Score: 24  6-Clicks Mobility Score: 24      Anticipated Discharge Dispo: Discharge Disposition: Discharged to home/self care (01) with close OP follow up     DME Needed: No    Action(s) Taken: Updated Provider/Nurse on Discharge Plan    Pt was discussed in IDT rounds. Plan  to discharge to home with family.   Pt does not have CM needs so far. Pt has good family support . Pt hs two Daughters who are RNs  Plan is meds to bed service.      Escalations Completed: None    Medically Clear: Yes    Next Steps:   CM to continue to assist Pt with discharge as needed    Barriers to Discharge: None    Is the patient up for discharge tomorrow: No

## 2025-03-13 NOTE — PROGRESS NOTES
Bedside report received.  Assessment complete.  A&O x 4. Patient calls appropriately. Primarily Russian speaking.  Patient ambulates with standby assist. Bed alarm off.   Patient has 0/10 pain. Patient medicated per MAR.  Denies N&V. Tolerating regular diet.  + void, + flatus, - BM.  Patient denies SOB.  SCD's refused.  Review plan with of care with patient. Call light and personal belongings within reach. Hourly rounding in place. All needs met at this time.

## 2025-03-13 NOTE — PROGRESS NOTES
Patient completed walking O2 with staff for 500 ft x1. Patient had x2 episodes of desatting to 88%; recovered immediately without supplemental O2. See walking O2 flow sheet.

## 2025-03-14 ENCOUNTER — HOSPITAL ENCOUNTER (INPATIENT)
Facility: MEDICAL CENTER | Age: 75
LOS: 3 days | DRG: 203 | End: 2025-03-17
Attending: EMERGENCY MEDICINE | Admitting: FAMILY MEDICINE
Payer: MEDICARE

## 2025-03-14 ENCOUNTER — APPOINTMENT (OUTPATIENT)
Dept: RADIOLOGY | Facility: MEDICAL CENTER | Age: 75
DRG: 203 | End: 2025-03-14
Attending: EMERGENCY MEDICINE
Payer: MEDICARE

## 2025-03-14 DIAGNOSIS — R09.02 HYPOXIA: ICD-10-CM

## 2025-03-14 DIAGNOSIS — J45.41 MODERATE PERSISTENT ASTHMA WITH ACUTE EXACERBATION: ICD-10-CM

## 2025-03-14 LAB
ALBUMIN SERPL BCP-MCNC: 4.2 G/DL (ref 3.2–4.9)
ALBUMIN/GLOB SERPL: 1.3 G/DL
ALP SERPL-CCNC: 69 U/L (ref 30–99)
ALT SERPL-CCNC: 27 U/L (ref 2–50)
ANION GAP SERPL CALC-SCNC: 12 MMOL/L (ref 7–16)
AST SERPL-CCNC: 34 U/L (ref 12–45)
BASOPHILS # BLD AUTO: 0.1 % (ref 0–1.8)
BASOPHILS # BLD: 0.01 K/UL (ref 0–0.12)
BILIRUB SERPL-MCNC: 0.3 MG/DL (ref 0.1–1.5)
BUN SERPL-MCNC: 19 MG/DL (ref 8–22)
CALCIUM ALBUM COR SERPL-MCNC: 8.9 MG/DL (ref 8.5–10.5)
CALCIUM SERPL-MCNC: 9.1 MG/DL (ref 8.5–10.5)
CHLORIDE SERPL-SCNC: 104 MMOL/L (ref 96–112)
CO2 SERPL-SCNC: 24 MMOL/L (ref 20–33)
CREAT SERPL-MCNC: 0.76 MG/DL (ref 0.5–1.4)
EKG IMPRESSION: NORMAL
EKG IMPRESSION: NORMAL
EOSINOPHIL # BLD AUTO: 0 K/UL (ref 0–0.51)
EOSINOPHIL NFR BLD: 0 % (ref 0–6.9)
ERYTHROCYTE [DISTWIDTH] IN BLOOD BY AUTOMATED COUNT: 45.7 FL (ref 35.9–50)
FLUAV RNA SPEC QL NAA+PROBE: NEGATIVE
FLUBV RNA SPEC QL NAA+PROBE: NEGATIVE
GFR SERPLBLD CREATININE-BSD FMLA CKD-EPI: 95 ML/MIN/1.73 M 2
GLOBULIN SER CALC-MCNC: 3.2 G/DL (ref 1.9–3.5)
GLUCOSE SERPL-MCNC: 139 MG/DL (ref 65–99)
HCT VFR BLD AUTO: 46.4 % (ref 42–52)
HGB BLD-MCNC: 15 G/DL (ref 14–18)
IMM GRANULOCYTES # BLD AUTO: 0.05 K/UL (ref 0–0.11)
IMM GRANULOCYTES NFR BLD AUTO: 0.5 % (ref 0–0.9)
LYMPHOCYTES # BLD AUTO: 1.24 K/UL (ref 1–4.8)
LYMPHOCYTES NFR BLD: 12 % (ref 22–41)
MCH RBC QN AUTO: 30.4 PG (ref 27–33)
MCHC RBC AUTO-ENTMCNC: 32.3 G/DL (ref 32.3–36.5)
MCV RBC AUTO: 93.9 FL (ref 81.4–97.8)
MONOCYTES # BLD AUTO: 0.89 K/UL (ref 0–0.85)
MONOCYTES NFR BLD AUTO: 8.6 % (ref 0–13.4)
NEUTROPHILS # BLD AUTO: 8.16 K/UL (ref 1.82–7.42)
NEUTROPHILS NFR BLD: 78.8 % (ref 44–72)
NRBC # BLD AUTO: 0 K/UL
NRBC BLD-RTO: 0 /100 WBC (ref 0–0.2)
NT-PROBNP SERPL IA-MCNC: 254 PG/ML (ref 0–125)
PLATELET # BLD AUTO: 181 K/UL (ref 164–446)
PMV BLD AUTO: 10.5 FL (ref 9–12.9)
POTASSIUM SERPL-SCNC: 4.5 MMOL/L (ref 3.6–5.5)
PROT SERPL-MCNC: 7.4 G/DL (ref 6–8.2)
RBC # BLD AUTO: 4.94 M/UL (ref 4.7–6.1)
RSV RNA SPEC QL NAA+PROBE: NEGATIVE
SARS-COV-2 RNA RESP QL NAA+PROBE: NOTDETECTED
SODIUM SERPL-SCNC: 140 MMOL/L (ref 135–145)
TROPONIN T SERPL-MCNC: 7 NG/L (ref 6–19)
WBC # BLD AUTO: 10.4 K/UL (ref 4.8–10.8)

## 2025-03-14 PROCEDURE — 71045 X-RAY EXAM CHEST 1 VIEW: CPT

## 2025-03-14 PROCEDURE — 0241U HCHG SARS-COV-2 COVID-19 NFCT DS RESP RNA 4 TRGT ED POC: CPT

## 2025-03-14 PROCEDURE — 700101 HCHG RX REV CODE 250: Performed by: BEHAVIOR ANALYST

## 2025-03-14 PROCEDURE — 96365 THER/PROPH/DIAG IV INF INIT: CPT

## 2025-03-14 PROCEDURE — 36415 COLL VENOUS BLD VENIPUNCTURE: CPT

## 2025-03-14 PROCEDURE — 94640 AIRWAY INHALATION TREATMENT: CPT

## 2025-03-14 PROCEDURE — 700111 HCHG RX REV CODE 636 W/ 250 OVERRIDE (IP): Performed by: EMERGENCY MEDICINE

## 2025-03-14 PROCEDURE — 93005 ELECTROCARDIOGRAM TRACING: CPT | Mod: TC | Performed by: EMERGENCY MEDICINE

## 2025-03-14 PROCEDURE — 85025 COMPLETE CBC W/AUTO DIFF WBC: CPT

## 2025-03-14 PROCEDURE — 96366 THER/PROPH/DIAG IV INF ADDON: CPT

## 2025-03-14 PROCEDURE — 99285 EMERGENCY DEPT VISIT HI MDM: CPT

## 2025-03-14 PROCEDURE — 94644 CONT INHLJ TX 1ST HOUR: CPT

## 2025-03-14 PROCEDURE — 84484 ASSAY OF TROPONIN QUANT: CPT

## 2025-03-14 PROCEDURE — 700101 HCHG RX REV CODE 250: Performed by: EMERGENCY MEDICINE

## 2025-03-14 PROCEDURE — 0202U NFCT DS 22 TRGT SARS-COV-2: CPT

## 2025-03-14 PROCEDURE — 93005 ELECTROCARDIOGRAM TRACING: CPT | Mod: TC

## 2025-03-14 PROCEDURE — 700102 HCHG RX REV CODE 250 W/ 637 OVERRIDE(OP): Performed by: BEHAVIOR ANALYST

## 2025-03-14 PROCEDURE — 80053 COMPREHEN METABOLIC PANEL: CPT

## 2025-03-14 PROCEDURE — 700105 HCHG RX REV CODE 258: Performed by: EMERGENCY MEDICINE

## 2025-03-14 PROCEDURE — 770001 HCHG ROOM/CARE - MED/SURG/GYN PRIV*

## 2025-03-14 PROCEDURE — A9270 NON-COVERED ITEM OR SERVICE: HCPCS | Performed by: BEHAVIOR ANALYST

## 2025-03-14 PROCEDURE — 83880 ASSAY OF NATRIURETIC PEPTIDE: CPT

## 2025-03-14 RX ORDER — PREDNISONE 50 MG/1
50 TABLET ORAL DAILY
Status: DISCONTINUED | OUTPATIENT
Start: 2025-03-15 | End: 2025-03-17 | Stop reason: HOSPADM

## 2025-03-14 RX ORDER — ALBUTEROL SULFATE 5 MG/ML
2.5 SOLUTION RESPIRATORY (INHALATION)
Status: DISCONTINUED | OUTPATIENT
Start: 2025-03-14 | End: 2025-03-17 | Stop reason: HOSPADM

## 2025-03-14 RX ORDER — ALBUTEROL SULFATE 90 UG/1
2 INHALANT RESPIRATORY (INHALATION) EVERY 4 HOURS
Status: DISCONTINUED | OUTPATIENT
Start: 2025-03-14 | End: 2025-03-15

## 2025-03-14 RX ORDER — IPRATROPIUM BROMIDE AND ALBUTEROL SULFATE 2.5; .5 MG/3ML; MG/3ML
3 SOLUTION RESPIRATORY (INHALATION)
Status: DISCONTINUED | OUTPATIENT
Start: 2025-03-14 | End: 2025-03-15

## 2025-03-14 RX ORDER — GUAIFENESIN 600 MG/1
1200 TABLET, EXTENDED RELEASE ORAL EVERY 12 HOURS
Status: DISCONTINUED | OUTPATIENT
Start: 2025-03-15 | End: 2025-03-17 | Stop reason: HOSPADM

## 2025-03-14 RX ORDER — ENOXAPARIN SODIUM 100 MG/ML
40 INJECTION SUBCUTANEOUS DAILY
Status: DISCONTINUED | OUTPATIENT
Start: 2025-03-15 | End: 2025-03-17 | Stop reason: HOSPADM

## 2025-03-14 RX ORDER — MAGNESIUM SULFATE HEPTAHYDRATE 40 MG/ML
2 INJECTION, SOLUTION INTRAVENOUS ONCE
Status: COMPLETED | OUTPATIENT
Start: 2025-03-14 | End: 2025-03-14

## 2025-03-14 RX ORDER — ACETAMINOPHEN 325 MG/1
650 TABLET ORAL EVERY 6 HOURS PRN
Status: DISCONTINUED | OUTPATIENT
Start: 2025-03-14 | End: 2025-03-17 | Stop reason: HOSPADM

## 2025-03-14 RX ORDER — IPRATROPIUM BROMIDE AND ALBUTEROL SULFATE 2.5; .5 MG/3ML; MG/3ML
3 SOLUTION RESPIRATORY (INHALATION)
Status: DISCONTINUED | OUTPATIENT
Start: 2025-03-14 | End: 2025-03-17 | Stop reason: HOSPADM

## 2025-03-14 RX ADMIN — ALBUTEROL SULFATE 2 PUFF: 90 AEROSOL, METERED RESPIRATORY (INHALATION) at 21:46

## 2025-03-14 RX ADMIN — IPRATROPIUM BROMIDE AND ALBUTEROL SULFATE 3 ML: .5; 2.5 SOLUTION RESPIRATORY (INHALATION) at 21:44

## 2025-03-14 RX ADMIN — MAGNESIUM SULFATE HEPTAHYDRATE 2 G: 2 INJECTION, SOLUTION INTRAVENOUS at 18:08

## 2025-03-14 RX ADMIN — Medication 10 MG/HR: at 17:51

## 2025-03-14 ASSESSMENT — PAIN DESCRIPTION - PAIN TYPE: TYPE: ACUTE PAIN

## 2025-03-14 ASSESSMENT — COPD QUESTIONNAIRES
COPD SCREENING SCORE: 3
DURING THE PAST 4 WEEKS HOW MUCH DID YOU FEEL SHORT OF BREATH: NONE/LITTLE OF THE TIME
HAVE YOU SMOKED AT LEAST 100 CIGARETTES IN YOUR ENTIRE LIFE: NO/DON'T KNOW
DO YOU EVER COUGH UP ANY MUCUS OR PHLEGM?: YES, A FEW DAYS A WEEK OR MONTH

## 2025-03-14 ASSESSMENT — ENCOUNTER SYMPTOMS
SPUTUM PRODUCTION: 1
CARDIOVASCULAR NEGATIVE: 1

## 2025-03-14 ASSESSMENT — FIBROSIS 4 INDEX: FIB4 SCORE: 3

## 2025-03-14 NOTE — Clinical Note
REFERRAL APPROVAL NOTICE         Sent on March 14, 2025                   Fernando Zimmer Grissom  [address]  [facility] [address]                   Dear Mr. Goran Grissom,    After a careful review of the medical information and benefit coverage, [facility] has processed your referral. See below for additional details.    If applicable, you must be actively enrolled with your insurance for coverage of the authorized service. If you have any questions regarding your coverage, please contact your insurance directly.    REFERRAL INFORMATION   Referral #:  [id number]  Referred-To Department    Referred-By Provider:  Pulmonary and Sleep Medicine    Ese Rogers D.O.   Pulmonary Rehab [facility]      [address]  [place] [address]  [phone] [id number]  [place] [address]  [phone]    Referral Start Date:  03/13/2025  Referral End Date:   03/13/2026             SCHEDULING  If you do not already have an appointment, please call [phone] to make an appointment.     MORE INFORMATION  If you do not already have a [facility] account, sign up at: [email or web address]  You can access your medical information, make appointments, see lab results, billing information, and more.  If you have questions regarding this referral, please contact  the [facility] Referrals department at:             [phone]. Monday - Friday 8:00AM - 5:00PM.     Sincerely,    [facility]

## 2025-03-14 NOTE — ED TRIAGE NOTES
Vitals:    03/14/25 1558   BP: (!) 165/72   Pulse: (!) 58   Resp: 18   Temp: 36.7 °C (98 °F)   SpO2: 92%     Chief Complaint   Patient presents with    Shortness of Breath     Pt was admitted here and discharged yesterday with an asthma exacerbation and viral infection. He was discharged on ABX, steroids, and albuterol. He was doing okay at home but now is very wheezy again and feels like he can't catch his breath.     Pt is accompanied by his daughter, he is alert and oriented x 4.

## 2025-03-14 NOTE — DISCHARGE SUMMARY
"[facility] MEDICINE DISCHARGE SUMMARY     PATIENT ID:  Name:             Fernando Eller     YOB: 1950  Age:                 74 y.o.  male   MRN:               [id number]  Address:         [place] [address]  Phone:            [phone] (home)    ADMISSION DATE:   3/12/2025    DISCHARGE DATE:   3/13/2025    DISCHARGE DIAGNOSES:   Primary Diagnoses:  Hypoxia likely secondary to viral infection possibly secondary to undiagnosed asthma exacerbation.    Secondary Diagnoses:   Prediabetes    ATTENDING PHYSICIAN:   Anayeli Ferris MD    RESIDENT:   Ese Rogers DO    CONSULTANTS:    None    PROCEDURES:    None    IMAGING:   DX-CHEST-PORTABLE (1 VIEW)   Final Result         No acute cardiac or pulmonary abnormality is identified.            PHYSICAL EXAM:   General: No acute distress, afebrile, very pleasant and smiling.  HEENT: NC/AT. EOMI.   Cardiovascular: RRR without murmurs. Normal capillary refill   Respiratory: No respiratory distress, mild crackles in a.m. that improved later in the day, nasal cannula in place in a.m., removed in p.m.  Abdomen: soft, nontender.  EXT:  AYOUB, no edema  Skin: No erythema/lesions   Neuro: Non-focal    LABS:  Recent Labs     03/12/25  1728 03/13/25  0508   WBC 7.0 5.2   RBC 5.13 4.92   HEMOGLOBIN 15.9 14.9   HEMATOCRIT 48.2 45.3   MCV 94.0 92.1   MCH 31.0 30.3   RDW 45.0 44.9   PLATELETCT 146* 159*   MPV 10.5 10.6   NEUTSPOLYS 51.70 77.20*   LYMPHOCYTES 29.20 18.20*   MONOCYTES 16.80* 3.80   EOSINOPHILS 1.60 0.00   BASOPHILS 0.40 0.20     Recent Labs     03/12/25  1728 03/13/25  0508   SODIUM 138 137   POTASSIUM 3.9 3.6   CHLORIDE 100 98   CO2 26 21   GLUCOSE 108* 233*   BUN 10 15     Lab Results   Component Value Date/Time    CHOLSTRLTOT 191 12/03/2019 09:07 AM     (H) 12/03/2019 09:07 AM    HDL 47 12/03/2019 09:07 AM    TRIGLYCERIDE 128 12/03/2019 09:07 AM       No results found for: \"TROPONINI\", \"CKMB\"  No results found for: " "\"TROPONINI\", \"CKMB\"         HOSPITAL COURSE:   Fernando Grissom is a 74 y.o. male without significant past medical history but suspected asthma who presented on 3/13 due to difficulty breathing.  Patient had felt ill and about annually experiences a and exacerbated respiratory infection.  Patient had productive cough.  Patient has never been diagnosed with asthma but has had improvement in the past with bronchodilators and did during this admission as well.  Patient has no history of smoking.  Patient does have history of working in [employer] though.  In the ED, patient was started on steroids and 3 days of azithromycin.  Patient was given 1 dose of C3 which was stopped due to negative Pro-Dexter and no signs of focal pneumonia.  Viral URI suspected.  Both the patient's daughters are nurses at [facility] and were very helpful throughout this admission and advocating for his care.  Patient was compliant with I-S use and improved overnight and throughout the day on 3/13, was weaned off oxygen and felt comfortable going home.  [facility] family medicine appointment was scheduled in 6 days for patient.  Patient was sent home with albuterol inhaler and steroid taper.    DISCHARGE CONDITION:    Stable    DISPOSITION:   Home     DISCHARGE MEDICATIONS:      Medication List        START taking these medications        Instructions   acetaminophen 325 MG Tabs  Commonly known as: Tylenol   Take 2 Tablets by mouth every four hours as needed for Fever.  Dose: 650 mg     albuterol 108 (90 Base) MCG/ACT Aers inhalation aerosol   Inhale 2 Puffs every 4 hours.  Dose: 2 Puff     azithromycin 500 MG tablet  Start taking on: March 14, 2025  Commonly known as: Zithromax   [place] 1 tableta por vía oral diariamente.  (Take 1 Tablet by mouth every day.)  Dose: 500 mg     Guaifenesin 1200 MG Tb12   Take 1 Tablet by mouth every 12 hours.  Dose: 1,200 mg     * predniSONE 50 MG Tabs  Start taking on: March 14, 2025  Commonly known as: Deltasone   Take 1 " Tablet by mouth every day. START 3/14/25.  Dose: 50 mg     * predniSONE 10 MG Tabs  Start taking on: March 16, 2025  Commonly known as: Deltasone   Take 4 Tablets by mouth every day for 2 days, THEN 3 Tablets every day for 2 days, THEN 2 Tablets every day for 2 days, THEN 1 Tablet every day for 2 days, THEN 0.5 Tablets every day for 2 days. START 3/16 - 3/26.           * This list has 2 medication(s) that are the same as other medications prescribed for you. Read the directions carefully, and ask your doctor or other care provider to review them with you.                CONTINUE taking these medications        Instructions   DERECK-SELTZER PLUS COLD & FLU PO   Take 1 Tablet by mouth every four hours as needed (cold/ flu).  Dose: 1 Tablet     PSEUDOEPHEDRINE HCL PO   Take 2 Tablets by mouth every four hours as needed for Congestion.  Dose: 2 Tablet                ACTIVITY:   Normal Activity as Tolerated.    DIET:   Healthy    DISCHARGE INSTRUCTIONS AND FOLLOW UP:  Patient is medically stable for discharge and will be discharged to Home with family.    PFTs ordered for pt.      Follow Up: [facility] on 3/19 with Dr. Younger.    Discharge Instructions:   Patient was instructed to return the ER in the event of worsening symptoms including but not limited to syncope, respiratory distress, fever or any other major concerns. Patient understands that failure to do so may indicate worsening of his medical condition(s) and result in adverse clinical outcomes including fatality. We have counseled the patient on the importance of compliance and the patient has agreed to proceed with all medical recommendations and follow up plan indicated above. The patient understands that the failure to do so may result in result in adverse clinical outcomes including fatality.       CC:   DI Mccabe MD ([facility])

## 2025-03-15 ENCOUNTER — HOSPITAL ENCOUNTER (OUTPATIENT)
Dept: RADIOLOGY | Facility: MEDICAL CENTER | Age: 75
DRG: 203 | End: 2025-03-15
Attending: BEHAVIOR ANALYST
Payer: MEDICARE

## 2025-03-15 PROBLEM — E87.6 HYPOKALEMIA: Status: ACTIVE | Noted: 2025-03-15

## 2025-03-15 LAB
ANION GAP SERPL CALC-SCNC: 11 MMOL/L (ref 7–16)
ANION GAP SERPL CALC-SCNC: 16 MMOL/L (ref 7–16)
B PARAP IS1001 DNA NPH QL NAA+NON-PROBE: NOT DETECTED
B PERT.PT PRMT NPH QL NAA+NON-PROBE: NOT DETECTED
BASOPHILS # BLD AUTO: 0.1 % (ref 0–1.8)
BASOPHILS # BLD: 0.01 K/UL (ref 0–0.12)
BUN SERPL-MCNC: 15 MG/DL (ref 8–22)
BUN SERPL-MCNC: 16 MG/DL (ref 8–22)
C PNEUM DNA NPH QL NAA+NON-PROBE: NOT DETECTED
CALCIUM SERPL-MCNC: 8.7 MG/DL (ref 8.5–10.5)
CALCIUM SERPL-MCNC: 9 MG/DL (ref 8.5–10.5)
CHLORIDE SERPL-SCNC: 101 MMOL/L (ref 96–112)
CHLORIDE SERPL-SCNC: 103 MMOL/L (ref 96–112)
CO2 SERPL-SCNC: 22 MMOL/L (ref 20–33)
CO2 SERPL-SCNC: 25 MMOL/L (ref 20–33)
CREAT SERPL-MCNC: 0.78 MG/DL (ref 0.5–1.4)
CREAT SERPL-MCNC: 0.9 MG/DL (ref 0.5–1.4)
EOSINOPHIL # BLD AUTO: 0 K/UL (ref 0–0.51)
EOSINOPHIL NFR BLD: 0 % (ref 0–6.9)
ERYTHROCYTE [DISTWIDTH] IN BLOOD BY AUTOMATED COUNT: 47.7 FL (ref 35.9–50)
FLUAV RNA NPH QL NAA+NON-PROBE: NOT DETECTED
FLUBV RNA NPH QL NAA+NON-PROBE: NOT DETECTED
GFR SERPLBLD CREATININE-BSD FMLA CKD-EPI: 90 ML/MIN/1.73 M 2
GFR SERPLBLD CREATININE-BSD FMLA CKD-EPI: 94 ML/MIN/1.73 M 2
GLUCOSE SERPL-MCNC: 122 MG/DL (ref 65–99)
GLUCOSE SERPL-MCNC: 157 MG/DL (ref 65–99)
HADV DNA NPH QL NAA+NON-PROBE: NOT DETECTED
HCOV 229E RNA NPH QL NAA+NON-PROBE: NOT DETECTED
HCOV HKU1 RNA NPH QL NAA+NON-PROBE: NOT DETECTED
HCOV NL63 RNA NPH QL NAA+NON-PROBE: NOT DETECTED
HCOV OC43 RNA NPH QL NAA+NON-PROBE: NOT DETECTED
HCT VFR BLD AUTO: 43.4 % (ref 42–52)
HGB BLD-MCNC: 14.2 G/DL (ref 14–18)
HMPV RNA NPH QL NAA+NON-PROBE: DETECTED
HPIV1 RNA NPH QL NAA+NON-PROBE: NOT DETECTED
HPIV2 RNA NPH QL NAA+NON-PROBE: NOT DETECTED
HPIV3 RNA NPH QL NAA+NON-PROBE: NOT DETECTED
HPIV4 RNA NPH QL NAA+NON-PROBE: NOT DETECTED
IMM GRANULOCYTES # BLD AUTO: 0.07 K/UL (ref 0–0.11)
IMM GRANULOCYTES NFR BLD AUTO: 0.7 % (ref 0–0.9)
LYMPHOCYTES # BLD AUTO: 1.36 K/UL (ref 1–4.8)
LYMPHOCYTES NFR BLD: 13.3 % (ref 22–41)
M PNEUMO DNA NPH QL NAA+NON-PROBE: NOT DETECTED
MAGNESIUM SERPL-MCNC: 2.6 MG/DL (ref 1.5–2.5)
MCH RBC QN AUTO: 31.3 PG (ref 27–33)
MCHC RBC AUTO-ENTMCNC: 32.7 G/DL (ref 32.3–36.5)
MCV RBC AUTO: 95.6 FL (ref 81.4–97.8)
MONOCYTES # BLD AUTO: 1.36 K/UL (ref 0–0.85)
MONOCYTES NFR BLD AUTO: 13.3 % (ref 0–13.4)
NEUTROPHILS # BLD AUTO: 7.39 K/UL (ref 1.82–7.42)
NEUTROPHILS NFR BLD: 72.6 % (ref 44–72)
NRBC # BLD AUTO: 0 K/UL
NRBC BLD-RTO: 0 /100 WBC (ref 0–0.2)
PHOSPHATE SERPL-MCNC: 2.9 MG/DL (ref 2.5–4.5)
PLATELET # BLD AUTO: 155 K/UL (ref 164–446)
PMV BLD AUTO: 10.5 FL (ref 9–12.9)
POTASSIUM SERPL-SCNC: 3.3 MMOL/L (ref 3.6–5.5)
POTASSIUM SERPL-SCNC: 5 MMOL/L (ref 3.6–5.5)
RBC # BLD AUTO: 4.54 M/UL (ref 4.7–6.1)
RSV RNA NPH QL NAA+NON-PROBE: NOT DETECTED
RV+EV RNA NPH QL NAA+NON-PROBE: NOT DETECTED
SARS-COV-2 RNA NPH QL NAA+NON-PROBE: NOTDETECTED
SODIUM SERPL-SCNC: 139 MMOL/L (ref 135–145)
SODIUM SERPL-SCNC: 139 MMOL/L (ref 135–145)
WBC # BLD AUTO: 10.2 K/UL (ref 4.8–10.8)

## 2025-03-15 PROCEDURE — 36415 COLL VENOUS BLD VENIPUNCTURE: CPT

## 2025-03-15 PROCEDURE — 700111 HCHG RX REV CODE 636 W/ 250 OVERRIDE (IP): Performed by: BEHAVIOR ANALYST

## 2025-03-15 PROCEDURE — 85025 COMPLETE CBC W/AUTO DIFF WBC: CPT

## 2025-03-15 PROCEDURE — 80048 BASIC METABOLIC PNL TOTAL CA: CPT

## 2025-03-15 PROCEDURE — 94640 AIRWAY INHALATION TREATMENT: CPT

## 2025-03-15 PROCEDURE — 83735 ASSAY OF MAGNESIUM: CPT

## 2025-03-15 PROCEDURE — 84100 ASSAY OF PHOSPHORUS: CPT

## 2025-03-15 PROCEDURE — 700102 HCHG RX REV CODE 250 W/ 637 OVERRIDE(OP): Performed by: BEHAVIOR ANALYST

## 2025-03-15 PROCEDURE — 770001 HCHG ROOM/CARE - MED/SURG/GYN PRIV*

## 2025-03-15 PROCEDURE — 700111 HCHG RX REV CODE 636 W/ 250 OVERRIDE (IP)

## 2025-03-15 PROCEDURE — A9270 NON-COVERED ITEM OR SERVICE: HCPCS | Performed by: BEHAVIOR ANALYST

## 2025-03-15 PROCEDURE — 700101 HCHG RX REV CODE 250: Performed by: BEHAVIOR ANALYST

## 2025-03-15 PROCEDURE — 99222 1ST HOSP IP/OBS MODERATE 55: CPT | Mod: AI,GC | Performed by: FAMILY MEDICINE

## 2025-03-15 PROCEDURE — 71250 CT THORAX DX C-: CPT

## 2025-03-15 RX ORDER — POTASSIUM CHLORIDE 1500 MG/1
40 TABLET, EXTENDED RELEASE ORAL DAILY
Status: DISCONTINUED | OUTPATIENT
Start: 2025-03-15 | End: 2025-03-15

## 2025-03-15 RX ORDER — MAGNESIUM SULFATE HEPTAHYDRATE 40 MG/ML
2 INJECTION, SOLUTION INTRAVENOUS ONCE
Status: COMPLETED | OUTPATIENT
Start: 2025-03-15 | End: 2025-03-15

## 2025-03-15 RX ADMIN — IPRATROPIUM BROMIDE AND ALBUTEROL SULFATE 3 ML: .5; 2.5 SOLUTION RESPIRATORY (INHALATION) at 01:56

## 2025-03-15 RX ADMIN — IPRATROPIUM BROMIDE AND ALBUTEROL SULFATE 3 ML: .5; 2.5 SOLUTION RESPIRATORY (INHALATION) at 07:11

## 2025-03-15 RX ADMIN — GUAIFENESIN 1200 MG: 600 TABLET, EXTENDED RELEASE ORAL at 17:24

## 2025-03-15 RX ADMIN — ENOXAPARIN SODIUM 40 MG: 100 INJECTION SUBCUTANEOUS at 17:24

## 2025-03-15 RX ADMIN — IPRATROPIUM BROMIDE AND ALBUTEROL SULFATE 3 ML: .5; 2.5 SOLUTION RESPIRATORY (INHALATION) at 10:34

## 2025-03-15 RX ADMIN — MOMETASONE FUROATE AND FORMOTEROL FUMARATE DIHYDRATE 2 PUFF: 200; 5 AEROSOL RESPIRATORY (INHALATION) at 07:15

## 2025-03-15 RX ADMIN — IPRATROPIUM BROMIDE AND ALBUTEROL SULFATE 3 ML: .5; 2.5 SOLUTION RESPIRATORY (INHALATION) at 14:34

## 2025-03-15 RX ADMIN — IPRATROPIUM BROMIDE AND ALBUTEROL SULFATE 3 ML: .5; 2.5 SOLUTION RESPIRATORY (INHALATION) at 22:06

## 2025-03-15 RX ADMIN — PREDNISONE 50 MG: 50 TABLET ORAL at 05:40

## 2025-03-15 RX ADMIN — ALBUTEROL SULFATE 2 PUFF: 90 AEROSOL, METERED RESPIRATORY (INHALATION) at 01:58

## 2025-03-15 RX ADMIN — MOMETASONE FUROATE AND FORMOTEROL FUMARATE DIHYDRATE 2 PUFF: 200; 5 AEROSOL RESPIRATORY (INHALATION) at 17:24

## 2025-03-15 RX ADMIN — MAGNESIUM SULFATE HEPTAHYDRATE 2 G: 2 INJECTION, SOLUTION INTRAVENOUS at 11:30

## 2025-03-15 RX ADMIN — IPRATROPIUM BROMIDE AND ALBUTEROL SULFATE 3 ML: .5; 2.5 SOLUTION RESPIRATORY (INHALATION) at 18:23

## 2025-03-15 RX ADMIN — POTASSIUM CHLORIDE 40 MEQ: 1500 TABLET, EXTENDED RELEASE ORAL at 05:40

## 2025-03-15 RX ADMIN — GUAIFENESIN 1200 MG: 600 TABLET, EXTENDED RELEASE ORAL at 05:40

## 2025-03-15 SDOH — HEALTH STABILITY: PHYSICAL HEALTH: ON AVERAGE, HOW MANY DAYS PER WEEK DO YOU ENGAGE IN MODERATE TO STRENUOUS EXERCISE (LIKE A BRISK WALK)?: 3 DAYS

## 2025-03-15 SDOH — ECONOMIC STABILITY: FOOD INSECURITY: WITHIN THE PAST 12 MONTHS, THE FOOD YOU BOUGHT JUST DIDN'T LAST AND YOU DIDN'T HAVE MONEY TO GET MORE.: NEVER TRUE

## 2025-03-15 SDOH — ECONOMIC STABILITY: TRANSPORTATION INSECURITY
IN THE PAST 12 MONTHS, HAS LACK OF TRANSPORTATION KEPT YOU FROM MEETINGS, WORK, OR FROM GETTING THINGS NEEDED FOR DAILY LIVING?: NO

## 2025-03-15 SDOH — HEALTH STABILITY: PHYSICAL HEALTH: ON AVERAGE, HOW MANY MINUTES DO YOU ENGAGE IN EXERCISE AT THIS LEVEL?: 20 MIN

## 2025-03-15 SDOH — ECONOMIC STABILITY: INCOME INSECURITY: HOW HARD IS IT FOR YOU TO PAY FOR THE VERY BASICS LIKE FOOD, HOUSING, MEDICAL CARE, AND HEATING?: NOT HARD AT ALL

## 2025-03-15 SDOH — ECONOMIC STABILITY: FOOD INSECURITY: WITHIN THE PAST 12 MONTHS, YOU WORRIED THAT YOUR FOOD WOULD RUN OUT BEFORE YOU GOT MONEY TO BUY MORE.: NEVER TRUE

## 2025-03-15 SDOH — ECONOMIC STABILITY: HOUSING INSECURITY
IN THE LAST 12 MONTHS, WAS THERE A TIME WHEN YOU DID NOT HAVE A STEADY PLACE TO SLEEP OR SLEPT IN A SHELTER (INCLUDING NOW)?: NO

## 2025-03-15 SDOH — ECONOMIC STABILITY: INCOME INSECURITY: IN THE LAST 12 MONTHS, WAS THERE A TIME WHEN YOU WERE NOT ABLE TO PAY THE MORTGAGE OR RENT ON TIME?: NO

## 2025-03-15 SDOH — HEALTH STABILITY: MENTAL HEALTH
STRESS IS WHEN SOMEONE FEELS TENSE, NERVOUS, ANXIOUS, OR CAN'T SLEEP AT NIGHT BECAUSE THEIR MIND IS TROUBLED. HOW STRESSED ARE YOU?: NOT AT ALL

## 2025-03-15 ASSESSMENT — COGNITIVE AND FUNCTIONAL STATUS - GENERAL
DAILY ACTIVITIY SCORE: 24
SUGGESTED CMS G CODE MODIFIER DAILY ACTIVITY: CH
MOBILITY SCORE: 24
SUGGESTED CMS G CODE MODIFIER MOBILITY: CH

## 2025-03-15 ASSESSMENT — LIFESTYLE VARIABLES
TOTAL SCORE: 0
EVER FELT BAD OR GUILTY ABOUT YOUR DRINKING: NO
HOW MANY STANDARD DRINKS CONTAINING ALCOHOL DO YOU HAVE ON A TYPICAL DAY: PATIENT DOES NOT DRINK
SKIP TO QUESTIONS 9-10: 1
TOTAL SCORE: 0
DOES PATIENT WANT TO STOP DRINKING: NO
ALCOHOL_USE: NO
ON A TYPICAL DAY WHEN YOU DRINK ALCOHOL HOW MANY DRINKS DO YOU HAVE: 0
AUDIT-C TOTAL SCORE: 0
EVER HAD A DRINK FIRST THING IN THE MORNING TO STEADY YOUR NERVES TO GET RID OF A HANGOVER: NO
TOTAL SCORE: 0
HAVE YOU EVER FELT YOU SHOULD CUT DOWN ON YOUR DRINKING: NO
HAVE PEOPLE ANNOYED YOU BY CRITICIZING YOUR DRINKING: NO
AVERAGE NUMBER OF DAYS PER WEEK YOU HAVE A DRINK CONTAINING ALCOHOL: 0
HOW OFTEN DO YOU HAVE SIX OR MORE DRINKS ON ONE OCCASION: NEVER
HOW MANY TIMES IN THE PAST YEAR HAVE YOU HAD 5 OR MORE DRINKS IN A DAY: 0
HOW OFTEN DO YOU HAVE A DRINK CONTAINING ALCOHOL: NEVER
CONSUMPTION TOTAL: NEGATIVE

## 2025-03-15 ASSESSMENT — SOCIAL DETERMINANTS OF HEALTH (SDOH)
IN THE PAST 12 MONTHS, HAS THE ELECTRIC, GAS, OIL, OR WATER COMPANY THREATENED TO SHUT OFF SERVICE IN YOUR HOME?: NO
DO YOU BELONG TO ANY CLUBS OR ORGANIZATIONS SUCH AS CHURCH GROUPS UNIONS, FRATERNAL OR ATHLETIC GROUPS, OR SCHOOL GROUPS?: NO
HOW OFTEN DO YOU ATTENT MEETINGS OF THE CLUB OR ORGANIZATION YOU BELONG TO?: NEVER
HOW MANY DRINKS CONTAINING ALCOHOL DO YOU HAVE ON A TYPICAL DAY WHEN YOU ARE DRINKING: PATIENT DOES NOT DRINK
WITHIN THE LAST YEAR, HAVE YOU BEEN AFRAID OF YOUR PARTNER OR EX-PARTNER?: NO
HOW OFTEN DO YOU ATTEND CHURCH OR RELIGIOUS SERVICES?: 1 TO 4 TIMES PER YEAR
DO YOU BELONG TO ANY CLUBS OR ORGANIZATIONS SUCH AS CHURCH GROUPS UNIONS, FRATERNAL OR ATHLETIC GROUPS, OR SCHOOL GROUPS?: NO
IN THE PAST 12 MONTHS, HAS THE ELECTRIC, GAS, OIL, OR WATER COMPANY THREATENED TO SHUT OFF SERVICE IN YOUR HOME?: NO
WITHIN THE PAST 12 MONTHS, YOU WORRIED THAT YOUR FOOD WOULD RUN OUT BEFORE YOU GOT THE MONEY TO BUY MORE: NEVER TRUE
WITHIN THE PAST 12 MONTHS, YOU WORRIED THAT YOUR FOOD WOULD RUN OUT BEFORE YOU GOT THE MONEY TO BUY MORE: NEVER TRUE
WITHIN THE LAST YEAR, HAVE YOU BEEN HUMILIATED OR EMOTIONALLY ABUSED IN OTHER WAYS BY YOUR PARTNER OR EX-PARTNER?: NO
HOW OFTEN DO YOU ATTENT MEETINGS OF THE CLUB OR ORGANIZATION YOU BELONG TO?: NEVER
WITHIN THE LAST YEAR, HAVE YOU BEEN KICKED, HIT, SLAPPED, OR OTHERWISE PHYSICALLY HURT BY YOUR PARTNER OR EX-PARTNER?: NO
IN A TYPICAL WEEK, HOW MANY TIMES DO YOU TALK ON THE PHONE WITH FAMILY, FRIENDS, OR NEIGHBORS?: MORE THAN THREE TIMES A WEEK
IN A TYPICAL WEEK, HOW MANY TIMES DO YOU TALK ON THE PHONE WITH FAMILY, FRIENDS, OR NEIGHBORS?: MORE THAN THREE TIMES A WEEK
HOW OFTEN DO YOU HAVE A DRINK CONTAINING ALCOHOL: NEVER
WITHIN THE PAST 12 MONTHS, THE FOOD YOU BOUGHT JUST DIDN'T LAST AND YOU DIDN'T HAVE MONEY TO GET MORE: NEVER TRUE
HOW OFTEN DO YOU HAVE SIX OR MORE DRINKS ON ONE OCCASION: NEVER
HOW HARD IS IT FOR YOU TO PAY FOR THE VERY BASICS LIKE FOOD, HOUSING, MEDICAL CARE, AND HEATING?: NOT HARD AT ALL
HOW OFTEN DO YOU GET TOGETHER WITH FRIENDS OR RELATIVES?: ONCE A WEEK
HOW OFTEN DO YOU ATTEND CHURCH OR RELIGIOUS SERVICES?: 1 TO 4 TIMES PER YEAR
WITHIN THE LAST YEAR, HAVE TO BEEN RAPED OR FORCED TO HAVE ANY KIND OF SEXUAL ACTIVITY BY YOUR PARTNER OR EX-PARTNER?: NO
HOW OFTEN DO YOU GET TOGETHER WITH FRIENDS OR RELATIVES?: ONCE A WEEK

## 2025-03-15 ASSESSMENT — FIBROSIS 4 INDEX: FIB4 SCORE: 3.08

## 2025-03-15 ASSESSMENT — PATIENT HEALTH QUESTIONNAIRE - PHQ9
SUM OF ALL RESPONSES TO PHQ9 QUESTIONS 1 AND 2: 0
2. FEELING DOWN, DEPRESSED, IRRITABLE, OR HOPELESS: NOT AT ALL
1. LITTLE INTEREST OR PLEASURE IN DOING THINGS: NOT AT ALL

## 2025-03-15 ASSESSMENT — PAIN DESCRIPTION - PAIN TYPE: TYPE: ACUTE PAIN

## 2025-03-15 NOTE — FACE TO FACE
Face to Face Note  -  Durable Medical Equipment    Patrick Juares M.D. - NPI: [id number]  I certify that this patient is under my care and that they had a durable medical equipment(DME)face to face encounter by myself that meets the physician DME face-to-face encounter requirements with this patient on:    Date of encounter:   Patient:                    MRN:                       YOB: 2024  Fernando Grissom  [id number]  1950     The encounter with the patient was in whole, or in part, for the following medical condition, which is the primary reason for durable medical equipment:  Asthma    I certify that, based on my findings, the following durable medical equipment is medically necessary:    Nebulizer.    My Clinical findings support the need for the above equipment due to:  Hypoxia

## 2025-03-15 NOTE — PROGRESS NOTES
Attending: Anayeli Ferris M.d.   Senior Resident: Dr. Meade   Parish Resident: Chas Younger M.D.    PATIENT: Fernando Grissom; [id number]; 1950    Hospital Day # Hospital Day: 2    ID: 74 y.o. male admitted for hypoxia secondary to asthma exacerbation in setting of Human Metapneumovirus infection.     24 Hour Events: No acute events overnight.    Subjective: Patient states he is feeling well this morning has no complaints.    OBJECTIVE:  Temp:  [36.7 °C (98 °F)-37.2 °C (99 °F)] 37.2 °C (99 °F)  Pulse:  [] 63  Resp:  [15-24] 18  BP: (131-165)/(63-72) 144/67  SpO2:  [90 %-96 %] 96 %    Intake/Output Summary (Last 24 hours) at 3/15/2025 1409  Last data filed at 3/15/2025 0849  Gross per 24 hour   Intake 200 ml   Output --   Net 200 ml       PE:  Gen: No acute distress, resting comfortably in bed  HEENT: normocephalic, atraumatic, EOMI  Pulm: clear to auscultation bilaterally, no respiratory distress.  Inspiratory and expiratory wheezes diffusely.  Cardio: RRR, no M/R/G  Abdom: soft, nontender, nondistended, normoactive bowel sounds in all quadrants  Ext: No edema, 2+ pulses bilaterally  Neuro: Grossly intact    LABS:  Recent Labs     03/13/25  0508 03/14/25  1636 03/15/25  0027   WBC 5.2 10.4 10.2   RBC 4.92 4.94 4.54*   HEMOGLOBIN 14.9 15.0 14.2   HEMATOCRIT 45.3 46.4 43.4   MCV 92.1 93.9 95.6   MCH 30.3 30.4 31.3   RDW 44.9 45.7 47.7   PLATELETCT 159* 181 155*   MPV 10.6 10.5 10.5   NEUTSPOLYS 77.20* 78.80* 72.60*   LYMPHOCYTES 18.20* 12.00* 13.30*   MONOCYTES 3.80 8.60 13.30   EOSINOPHILS 0.00 0.00 0.00   BASOPHILS 0.20 0.10 0.10     Recent Labs     03/12/25  1728 03/13/25  0508 03/14/25  1636 03/15/25  0027 03/15/25  1026   SODIUM 138   < > 140 139 139   POTASSIUM 3.9   < > 4.5 3.3* 5.0   CHLORIDE 100   < > 104 101 103   CO2 26   < > 24 22 25   BUN 10   < > 19 16 15   CREATININE 0.86   < > 0.76 0.78 0.90   CALCIUM 9.3   < > 9.1 8.7 9.0   MAGNESIUM  --   --   --   --  2.6*  "  PHOSPHORUS  --   --   --   --  2.9   ALBUMIN 4.3  --  4.2  --   --     < > = values in this interval not displayed.     Estimated GFR/CRCL = Estimated Creatinine Clearance: 80.2 mL/min (by C-G formula based on SCr of 0.9 mg/dL).  Recent Labs     03/14/25  1636 03/15/25  0027 03/15/25  1026   GLUCOSE 139* 157* 122*     Recent Labs     03/12/25  1728 03/14/25  1636   ASTSGOT 32 34   ALTSGPT 24 27   TBILIRUBIN 0.5 0.3   ALKPHOSPHAT 83 69   GLOBULIN 3.6* 3.2             No results for input(s): \"INR\", \"APTT\", \"FIBRINOGEN\" in the last 72 hours.    Invalid input(s): \"DIMER\"    MICROBIOLOGY:   No results found for: \"BLOODCULTU\", \"BLDCULT\", \"BCHOLD\"     IMAGING:   CT-CHEST (THORAX) W/O   Final Result         1.  Minimal subtle scattered reticular opacities in bilateral upper lobes, could represent minimal component of atypical infiltrate   2.  Mildly enlarged mediastinal lymph nodes, increased somewhat since prior study. Consider causes of adenopathy with additional workup as clinically appropriate   3.  Atherosclerosis      Note: Evaluation is limited due to respiratory motion artifacts.      DX-CHEST-PORTABLE (1 VIEW)   Final Result      No acute cardiopulmonary abnormality.             MEDS:  Current Facility-Administered Medications   Medication Last Admin    mometasone-formoterol (Dulera) 200-5 MCG/ACT inhaler 2 Puff 2 Puff at 03/15/25 0715    guaiFENesin ER (Mucinex) tablet 1,200 mg 1,200 mg at 03/15/25 0540    ipratropium-albuterol (DUONEB) nebulizer solution 3 mL at 03/15/25 1034    enoxaparin (Lovenox) inj 40 mg      acetaminophen (Tylenol) tablet 650 mg      Respiratory Therapy Consult      predniSONE (Deltasone) tablet 50 mg 50 mg at 03/15/25 0540    albuterol (Proventil) 2.5mg/0.5ml nebulizer solution 2.5 mg         ASSESSMENT/PLAN: 74 y.o. male with     * Hypoxia- (present on admission)  Assessment & Plan  #asthma exacerbation  #Human Metapneumovirus infection  CBC unremarkable (no leukocytosis, no " eosinophilia), cmp showing mild hypoK likely d/t multiple albuterol use, cxr negative, viral panel pos for Human Mentapneumovirus; overall responding to breathing tx; received Mg in ED x1; req o2 supp. CT with minimal subtle scattered reticular opacities in bilateral upper lobes.  Mildly enlarged mediastinal lymph node, increased from prior.    Plan:  -Continue DuoNebs  -cont prednisone course 50mg daily (3/15 would be day 3 out of 5)  -RT consulted  -continous pulse ox  -O2 supp, wean as tolerated  -resp hygiene  -contact precautions  -cont home alb inh prn  -cont home ics/laba  -monitor vs and labored breathing  -DME nebulizer ordered    Hypokalemia  Assessment & Plan  On adm K is 3.3; likely d/t multiple use of albuterol in setting of asthma exacerbation.   -replenish K  -trend K          Core Measures:  Fluids: P.o.  Lines: PIV  Abx: None  Diet: Regular  PPX: SCDs/TEDs, Lovenox  Wound care: No wounds      #DISPOSITION  Inpatient for supplemental oxygen        Chas Younger M.D.   PGY1  [facility]

## 2025-03-15 NOTE — PROGRESS NOTES
4 Eyes Skin Assessment Completed by YASMIN Humphrey and YASMIN Dougherty.    Head WDL  Ears WDL  Nose WDL  Mouth WDL  Neck WDL  Breast/Chest WDL  Shoulder Blades WDL  Spine WDL with dark skin tags  (R) Arm/Elbow/Hand WDL  (L) Arm/Elbow/Hand WDL  Abdomen WDL  Groin WDL  Scrotum/Coccyx/Buttocks WDL  (R) Leg WDL  (L) Leg WDL  (R) Heel/Foot/Toe WDL, dryness  (L) Heel/Foot/Toe WDL, dryness        Devices In Places Pulse Ox and Nasal Cannula, R hand PIV      Interventions In Place Gray Ear Foams, NC W/Ear Foams, Pillows, and Pressure Redistribution Mattress    Possible Skin Injury No    Pictures Uploaded Into Epic N/A  Wound Consult Placed N/A  RN Wound Prevention Protocol Ordered No

## 2025-03-15 NOTE — ED NOTES
Rounded on pt. Pt resting on gurney. Family at bedside. Bed locked and in lowest position. Call light within reach. Respirations equal and unlabored on 2L via NC. No further needs at this time.

## 2025-03-15 NOTE — ASSESSMENT & PLAN NOTE
#asthma exacerbation  #Human Metapneumovirus infection  CBC unremarkable (no leukocytosis, no eosinophilia), cmp showing mild hypoK likely d/t multiple albuterol use, cxr negative, viral panel pos for Human Mentapneumovirus; overall responding to breathing tx; received Mg in ED x1; req o2 supp. CT with minimal subtle scattered reticular opacities in bilateral upper lobes.  Mildly enlarged mediastinal lymph node, increased from prior.    Plan:  -Continue DuoNebs  -cont prednisone course 50mg daily (3/15 would be day 4 out of 5)  -RT consulted  -continous pulse ox  -O2 supp, wean as tolerated  -resp hygiene  -contact precautions  -cont home alb inh prn  -cont home Dulera  -DME nebulizer ordered  -trial lasix 20mg PO, crackles

## 2025-03-15 NOTE — ED PROVIDER NOTES
ER Provider Note    Scribed for Jamaal Carpenter M.d. by Desmond Greenfield. 3/14/2025  5:09 PM    Primary Care Provider: Patrick Juares M.D.    CHIEF COMPLAINT   Chief Complaint   Patient presents with    Shortness of Breath     EXTERNAL RECORDS REVIEWED  Inpatient Notes Patient was admitted on the 12th and was discharged yesterday for hypoxia. The cause was viral.     HPI/ROS  LIMITATION TO HISTORY   Select: : None  OUTSIDE HISTORIAN(S):  Family present at bedside to provide patient history.    Fernando Grissom is a 74 y.o. male who presents to the ED complaining of shortness of breath onset earlier today. The patient's daughter explains the patient was wheezing and having shortness of breath a couple days ago. He was soon after admitted for hypoxia and was discharged yesterday. He was sent home with Inhaler and Prednisone. She states since discharge patient was feeling improved and his wheezing was completely gone. However, this afternoon patient began wheezing again, prompting her visit. She states patient took 50 mg of Prednisone this morning. He reports alleviation from being placed on oxygen. Patient denies chest pain. He notes when he was younger he had an asthma attack but since then has never had any other symptoms of it. Patient has history of tonsillectomy and hyperlipidemia.     PAST MEDICAL HISTORY  Past Medical History:   Diagnosis Date    High cholesterol     History of BPH     S/P TURP    Lymphoma (HCC) 2010       SURGICAL HISTORY  Past Surgical History:   Procedure Laterality Date    MASS EXCISION GENERAL Left 12/26/2019    Procedure: EXCISION, MASS, GENERAL-ARM;  Surgeon: Dmitry Ye M.D.;  Location: SURGERY SAME DAY [facility];  Service: General    LESION EXCISION ORTHO Left 12/26/2019    Procedure: EXCISION, LESION;  Surgeon: Dmitry Ye M.D.;  Location: SURGERY SAME DAY [facility];  Service: General    TURP-VAPOR  2017    BONE MARROW ASPIRATION  10/26/2010     Performed by JOAQUIM CRUZ at ENDOSCOPY [facility]    BONE MARROW BIOPSY, NDL/TROCAR  10/26/2010    Performed by JOAQUIM CRUZ at ENDOSCOPY [facility]    TONSILLECTOMY  9/17/2010    Performed by TESS PETTIT at SURGERY SAME DAY [facility]    OTHER ORTHOPEDIC SURGERY         FAMILY HISTORY  No family history noted.   SOCIAL HISTORY   reports that he has never smoked. He has never used smokeless tobacco. He reports that he does not drink alcohol and does not use drugs.    CURRENT MEDICATIONS  Previous Medications    ACETAMINOPHEN (TYLENOL) 325 MG TAB    Take 2 Tablets by mouth every four hours as needed for Fever.    ALBUTEROL 108 (90 BASE) MCG/ACT AERO SOLN INHALATION AEROSOL    Inhale 2 Puffs every 4 hours.    AZITHROMYCIN (ZITHROMAX) 500 MG TABLET    Take 1 Tablet by mouth every day.    GUAIFENESIN 1200 MG TABLET SR 12 HR    Take 1 Tablet by mouth every 12 hours.    UFYUEXUBK-WVY-UF-APAP (DERECK-SELTZER PLUS COLD & FLU PO)    Take 1 Tablet by mouth every four hours as needed (cold/ flu).    PREDNISONE (DELTASONE) 10 MG TAB    Take 4 Tablets by mouth every day for 2 days, THEN 3 Tablets every day for 2 days, THEN 2 Tablets every day for 2 days, THEN 1 Tablet every day for 2 days, THEN 0.5 Tablets every day for 2 days. START 3/16 - 3/26.    PREDNISONE (DELTASONE) 50 MG TAB    Take 1 Tablet by mouth every day. START 3/14/25.    PSEUDOEPHEDRINE HCL PO    Take 2 Tablets by mouth every four hours as needed for Congestion.       ALLERGIES  Patient has no known allergies.    PHYSICAL EXAM  BP (!) 146/68   Pulse (!) 53   Temp 36.7 °C (98 °F) (Temporal)   Resp 20   Ht 1.829 m (6')   Wt 85.5 kg (188 lb 7.9 oz)   SpO2 93%   BMI 25.56 kg/m²   Constitutional: Well developed, Well nourished, no acute distress.   HENT: Normocephalic, Atraumatic, Oropharynx moist, No oral exudates.   Eyes: Conjunctiva normal, No discharge.   Neck: Supple, No stridor  Cardiovascular: Normal heart rate, Normal rhythm, No  murmurs, equal pulses.   Pulmonary: Normal breath sounds, No respiratory distress, bilateral wheezing, No rales, No rhonchi.  Chest: No chest wall tenderness or deformity.   Abdomen: Soft, No tenderness, No masses, no rebound, no guarding.   Back: No CVA tenderness.   Musculoskeletal: No major deformities noted, No tenderness. No edema in legs.   Skin: Warm, Dry, No erythema, No rash.   Neurologic: Alert & oriented x 3, Normal motor function,  No focal deficits noted.   Psychiatric: Affect normal, Judgment normal, Mood normal.     DIAGNOSTIC STUDIES    EKG/LABS  Results for orders placed or performed during the hospital encounter of 25   EKG    Collection Time: 25  4:07 PM   Result Value Ref Range    Report       [facility] Emergency Dept.    Test Date:  2025  Pt Name:    NANCY CENTENO         Department: ER  MRN:        [id number]                      Room:  Gender:     Male                         Technician: [id number]  :        1950                   Requested By:ER TRIAGE PROTOCOL  Order #:    [id number]                    Reading MD:    Measurements  Intervals                                Axis  Rate:       57                           P:          65  PA:         130                          QRS:        68  QRSD:       117                          T:          38  QT:         443  QTc:        432    Interpretive Statements  Sinus bradycardia  Nonspecific intraventricular conduction delay  Borderline low voltage, extremity leads  Compared to ECG 2025 17:25:55  Intraventricular conduction delay now present  Sinus rhythm no longer present  Atrial premature complex(es) no longer present     CBC with Differential    Collection Time: 25  4:36 PM   Result Value Ref Range    WBC 10.4 4.8 - 10.8 K/uL    RBC 4.94 4.70 - 6.10 M/uL    Hemoglobin 15.0 14.0 - 18.0 g/dL    Hematocrit 46.4 42.0 - 52.0 %    MCV 93.9 81.4 - 97.8 fL    MCH 30.4 27.0 - 33.0 pg    MCHC 32.3  32.3 - 36.5 g/dL    RDW 45.7 35.9 - 50.0 fL    Platelet Count 181 164 - 446 K/uL    MPV 10.5 9.0 - 12.9 fL    Neutrophils-Polys 78.80 (H) 44.00 - 72.00 %    Lymphocytes 12.00 (L) 22.00 - 41.00 %    Monocytes 8.60 0.00 - 13.40 %    Eosinophils 0.00 0.00 - 6.90 %    Basophils 0.10 0.00 - 1.80 %    Immature Granulocytes 0.50 0.00 - 0.90 %    Nucleated RBC 0.00 0.00 - 0.20 /100 WBC    Neutrophils (Absolute) 8.16 (H) 1.82 - 7.42 K/uL    Lymphs (Absolute) 1.24 1.00 - 4.80 K/uL    Monos (Absolute) 0.89 (H) 0.00 - 0.85 K/uL    Eos (Absolute) 0.00 0.00 - 0.51 K/uL    Baso (Absolute) 0.01 0.00 - 0.12 K/uL    Immature Granulocytes (abs) 0.05 0.00 - 0.11 K/uL    NRBC (Absolute) 0.00 K/uL   Comp Metabolic Panel    Collection Time: 03/14/25  4:36 PM   Result Value Ref Range    Sodium 140 135 - 145 mmol/L    Potassium 4.5 3.6 - 5.5 mmol/L    Chloride 104 96 - 112 mmol/L    Co2 24 20 - 33 mmol/L    Anion Gap 12.0 7.0 - 16.0    Glucose 139 (H) 65 - 99 mg/dL    Bun 19 8 - 22 mg/dL    Creatinine 0.76 0.50 - 1.40 mg/dL    Calcium 9.1 8.5 - 10.5 mg/dL    Correct Calcium 8.9 8.5 - 10.5 mg/dL    AST(SGOT) 34 12 - 45 U/L    ALT(SGPT) 27 2 - 50 U/L    Alkaline Phosphatase 69 30 - 99 U/L    Total Bilirubin 0.3 0.1 - 1.5 mg/dL    Albumin 4.2 3.2 - 4.9 g/dL    Total Protein 7.4 6.0 - 8.2 g/dL    Globulin 3.2 1.9 - 3.5 g/dL    A-G Ratio 1.3 g/dL   proBrain Natriuretic Peptide, NT    Collection Time: 03/14/25  4:36 PM   Result Value Ref Range    NT-proBNP 254 (H) 0 - 125 pg/mL   Troponin    Collection Time: 03/14/25  4:36 PM   Result Value Ref Range    Troponin T 7 6 - 19 ng/L   ESTIMATED GFR    Collection Time: 03/14/25  4:36 PM   Result Value Ref Range    GFR (CKD-EPI) 95 >60 mL/min/1.73 m 2   POC CoV-2, FLU A/B, RSV by PCR    Collection Time: 03/14/25  5:46 PM   Result Value Ref Range    POC Influenza A RNA, PCR Negative Negative    POC Influenza B RNA, PCR Negative Negative    POC RSV, by PCR Negative Negative    POC SARS-CoV-2, PCR  NotDetected NotDetected   EKG    Collection Time: 25  7:49 PM   Result Value Ref Range    Report       [facility] Emergency Dept.    Test Date:  2025  Pt Name:    NANCY CENTENO         Department: ER  MRN:        [id number]                      Room:  Gender:     Male                         Technician: [id number]  :        1950                   Requested By:ER TRIAGE PROTOCOL  Order #:    [id number]                    Reading MD: OLE MORROW MD    Measurements  Intervals                                Axis  Rate:       57                           P:          65  MA:         130                          QRS:        68  QRSD:       117                          T:          38  QT:         443  QTc:        432    Interpretive Statements  Sinus bradycardia, Rate of 57, normal axis, no ST elevation  Nonspecific intraventricular conduction delay  Borderline low voltage, extremity leads  Compared to ECG 2025 17:25:55  Intraventricular conduction delay now present  Sinus rhythm no longer present  Atrial premature complex(es) no longer present   Electronically Signed On 2025 19:49:57 PDT by OLE MORROW MD         I have independently interpreted this EKG    RADIOLOGY/PROCEDURES   The attending emergency physician has independently interpreted the diagnostic imaging associated with this visit and am waiting the final reading from the radiologist.   My preliminary interpretation is a follows: Chest x-ray does not show any pneumonia    Radiologist interpretation:  DX-CHEST-PORTABLE (1 VIEW)   Final Result      No acute cardiopulmonary abnormality.             COURSE & MEDICAL DECISION MAKING     ASSESSMENT, COURSE AND PLAN  Care Narrative:     5:20 PM - Patient was seen and evaluated. They present for wheezing. I will order SARS-CoV-2, Flu A/B, and RSV, CBC with diff, CMP, proBrain Natriuretic Peptide, Troponin, EKG and DX-Chest-Portable to evaluate. I  discussed this plan with the patient, they were agreeable.     Patient was reexamined at 7:45 PM he is still has notable wheezing bilaterally with tachypnea and mildly hypoxic on room air at 88%.     8:15 PM discussed the case with [facility] family they have agreed to consult on hospitalization    PROBLEM LIST  At this point time patient appears to have a worsening exacerbation of his asthma.  Despite being on steroids and obtaining breathing treatments as well as magnesium patient continues to have significant work of breathing with wheezing.  At this point time I think he would benefit from hospitalization with oxygen support and continued breathing treatments.    DISPOSITION AND DISCUSSIONS  I have discussed management of the patient with the following physicians and MARILU's: [facility] family resident    Discussion of management with other QHP or appropriate source(s): RT about breathing treatments      DISPOSITION:  Patient will be hospitalized by  [facility] family in guarded condition.    FINAL DIANGOSIS  1. Moderate persistent asthma with acute exacerbation    2. Hypoxia        Desmond NICOLAS (Scribe), am scribing for, and in the presence of, AUSTIN Rasmussen*.    Electronically signed by: Desmond Greenfield (Mary Beth), 3/14/2025    Jamaal NICOLAS M.* personally performed the services described in this documentation, as scribed by Desmond Greenfield in my presence, and it is both accurate and complete.     The note accurately reflects work and decisions made by me.  Jamaal Carpenter M.D.  3/14/2025  8:25 PM

## 2025-03-15 NOTE — CARE PLAN
Problem: Knowledge Deficit - Standard  Goal: Patient and family/care givers will demonstrate understanding of plan of care, disease process/condition, diagnostic tests and medications  Outcome: Progressing     Problem: Communication  Goal: The ability to communicate needs accurately and effectively will improve  Outcome: Progressing     Problem: Respiratory  Goal: Patient will achieve/maintain optimum respiratory ventilation and gas exchange  Outcome: Progressing   The patient is Stable - Low risk of patient condition declining or worsening    Shift Goals  Clinical Goals: monitor respiratory status, pulmonary hygiene  Patient Goals: rest  Family Goals: Updates    Progress made toward(s) clinical / shift goals:  patient working independently with incentive spirometer. Requiring 1-2L O2.    Patient is not progressing towards the following goals:

## 2025-03-15 NOTE — PROGRESS NOTES
Bedside report received.  Assessment complete.  A&O x 4. Patient calls appropriately.  Patient ambulates with no assist. Bed alarm off.   Patient has 0/10 pain. Patient medicated per MAR.  Denies N&V. Tolerating regular diet.  + void, + flatus, + BM.  SOB improving. Working independently with IS.  SCD's off, patient ambulates ad richardson.  Patient is pleasant and cooperative with the care plan. Family at bedside.  Review plan with of care with patient. Call light and personal belongings within reach. Hourly rounding in place. All needs met at this time.  BP (!) 144/67   Pulse 67   Temp 37.2 °C (99 °F) (Temporal)   Resp 18   Ht 1.829 m (6')   Wt 85.5 kg (188 lb 7.9 oz)   SpO2 94%   BMI 25.56 kg/m²

## 2025-03-15 NOTE — H&P
[facility] MEDICINE HISTORY AND PHYSICAL     PATIENT ID:  NAME:  Fernando Grissom  MRN:               [id number]  YOB: 1950    Date of Admission: 3/14/2025     Attending: Anayeli Ferris M.d.    Resident: Patrick Juares MD     Primary Care Physician:  Chas Younger M.D.      CC:  wheezing      HPI: Fernando Grissom is a 74 y.o. male w/ pmh of childhood asthma who was discharged from the hospital yesterday after a 1d hospitalization for w/ prelim dx of asthma (vs less likely copd) c/b exacerbation w/ hypoxia, now returns to hospital as his daughter who is a nurse witnessed him having significant wheezing and slight work of breathing. He was d/c on albuterol inhaler prn and ics/laba inhaler. He has hx of pna and bronchitis in the winter season d/t URI's per his daughter at bedside.     In the past few days he has been suffering from URI sx, most likely viral in nature as cxr neg, no cxr of focal pna, procalc neg, and no leukocytosis. Did have fevers, but none since discharging and overall URI sx improving slightly.     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     This pt is very pleasant, but stoic in nature, therefore he does not complain much of sx altho signs of respiratory concerns are present.     ERCourse:  CBC unremarkable, cmp showing mild hypoK likely d/t albuterol use, cxr negative, viral panel pos for Human Mentapneumovirus;     REVIEW OF SYSTEMS:   Ten systems reviewed and were negative except as noted in the HPI.                PAST MEDICAL HISTORY:  Past Medical History:   Diagnosis Date    High cholesterol     History of BPH     S/P TURP    Lymphoma (HCC) 2010       PAST SURGICAL HISTORY:  Past Surgical History:   Procedure Laterality Date    MASS EXCISION GENERAL Left 12/26/2019    Procedure: EXCISION, MASS, GENERAL-ARM;  Surgeon: Dmitry  Dave Ye M.D.;  Location: SURGERY SAME DAY [facility];  Service: General    LESION EXCISION ORTHO Left 12/26/2019    Procedure: EXCISION, LESION;  Surgeon: Dmitry Ye M.D.;  Location: SURGERY SAME DAY [facility];  Service: General    TURP-VAPOR  2017    BONE MARROW ASPIRATION  10/26/2010    Performed by JOAQUIM CRUZ at ENDOSCOPY [facility]    BONE MARROW BIOPSY, NDL/TROCAR  10/26/2010    Performed by JOAQUIM CRUZ at ENDOSCOPY [facility]    TONSILLECTOMY  9/17/2010    Performed by TESS PETTIT at SURGERY SAME DAY [facility]    OTHER ORTHOPEDIC SURGERY         FAMILY HISTORY:  No family history on file.    SOCIAL HISTORY:   See hpi    DIET:   Orders Placed This Encounter   Procedures    Diet Order Diet: Regular     Standing Status:   Standing     Number of Occurrences:   1     Diet::   Regular [1]       ALLERGIES:  No Known Allergies    OUTPATIENT MEDICATIONS:    Current Facility-Administered Medications:     ipratropium (Atrovent) 0.02 % nebulizer solution 0.5 mg, 0.5 mg, Nebulization, ONCE (RT), Jamaal Carpenter M.D.    albuterol inhaler 2 Puff, 2 Puff, Inhalation, Q4HRS, Patrick Juares M.D., 2 Puff at 03/15/25 0158    guaiFENesin ER (Mucinex) tablet 1,200 mg, 1,200 mg, Oral, Q12HRS, Patrick Juares M.D.    ipratropium-albuterol (DUONEB) nebulizer solution, 3 mL, Nebulization, Q4HRS (RT), Patrick Juares M.D., 3 mL at 03/15/25 0156    enoxaparin (Lovenox) inj 40 mg, 40 mg, Subcutaneous, DAILY AT 1800, Patrick Juares M.D.    acetaminophen (Tylenol) tablet 650 mg, 650 mg, Oral, Q6HRS PRN, Patrick Juares M.D.    Respiratory Therapy Consult, , Nebulization, Continuous RT, Patrick Juares M.D.    predniSONE (Deltasone) tablet 50 mg, 50 mg, Oral, DAILY, Patrick Juares M.D.    mometasone-formoterol (Dulera) 200-5 MCG/ACT inhaler 2 Puff, 2 Puff, Inhalation, BID (RT), Anayeli Ferris M.D.    albuterol (Proventil) 2.5mg/0.5ml nebulizer solution 2.5 mg, 2.5 mg,  Nebulization, Q2HRS PRN (RT), Anayeli Ferris M.D.    PHYSICAL EXAM:  Vitals:    25 2117 25 2150 25 2320 03/15/25 0156   BP: (!) 152/72  132/63    Pulse: 94 97 82 87   Resp: 20 18 18 16   Temp: 36.7 °C (98.1 °F)  37.2 °C (99 °F)    TempSrc: Temporal  Temporal    SpO2: 91% 94% 92% 94%   Weight:       Height:       , Temp (24hrs), Av.9 °C (98.4 °F), Min:36.7 °C (98 °F), Max:37.2 °C (99 °F)  , Pulse Oximetry: 94 %, O2 (LPM): 2, O2 Delivery Device: Nasal Cannula    General: Pt resting in NAD, cooperative   Skin:  Pink, warm and dry.  No rashes  HEENT: NC/AT. PERRL. EOMI. MMM. No nasal discharge. Oropharynx nonerythematous without exudate/plaques  Neck:  Supple without lymphadenopathy or rigidity.   Lungs:  Symmetrical.  CTAB with no W/R/R.  Good air movement   Cardiovascular:  S1/S2 RRR without murmurs  Abdomen:  Abdomen is soft, nontender, nondistended. +BS. No masses noted.  Extremities:  Full range of motion. No gross deformities noted. 2+ pulses in all extremities. No edema   Spine:  Straight without vertebral anomalies.  CNS:  Muscle tone is normal. No gross focal neurologic deficits      LAB TESTS:   Recent Labs     25  0508 25  1636 03/15/25  0027   WBC 5.2 10.4 10.2   RBC 4.92 4.94 4.54*   HEMOGLOBIN 14.9 15.0 14.2   HEMATOCRIT 45.3 46.4 43.4   MCV 92.1 93.9 95.6   MCH 30.3 30.4 31.3   RDW 44.9 45.7 47.7   PLATELETCT 159* 181 155*   MPV 10.6 10.5 10.5   NEUTSPOLYS 77.20* 78.80* 72.60*   LYMPHOCYTES 18.20* 12.00* 13.30*   MONOCYTES 3.80 8.60 13.30   EOSINOPHILS 0.00 0.00 0.00   BASOPHILS 0.20 0.10 0.10         Recent Labs     25  1728 25  0508 25  1636 03/15/25  0027   SODIUM 138 137 140 139   POTASSIUM 3.9 3.6 4.5 3.3*   CHLORIDE 100 98 104 101   CO2 26 21 24 22   BUN 10 15 19 16   CREATININE 0.86 0.87 0.76 0.78   CALCIUM 9.3 9.1 9.1 8.7   ALBUMIN 4.3  --  4.2  --        CULTURES:   Results       Procedure Component Value Units Date/Time    Respiratory  Panel by PCR (Inpatient ONLY) [[id number]]  (Abnormal) Collected: 03/14/25 6229    Order Status: Completed Specimen: Respirate from Nasopharyngeal Updated: 03/15/25 0203     Adenovirus, PCR Not Detected     SARS-CoV-2 (COVID-19) RNA by PETTY NotDetected     Comment: [facility] providers: PLEASE REFER TO DE-ESCALATION AND RETESTING PROTOCOL  on [email or web address]    **The [employer] Respiratory Panel 2.1 (RP2.1) RT-PCR test is FDA authorized.          Coronavirus 229E, PCR Not Detected     Coronavirus HKU1, PCR Not Detected     Coronavirus NL63, PCR Not Detected     Coronavirus OC43, PCR Not Detected     Human Metapneumovirus, PCR DETECTED     Rhino/Enterovirus, PCR Not Detected     Influenza A, PCR Not Detected     Influenza B, PCR Not Detected     Parainfluenza 1, PCR Not Detected     Parainfluenza 2, PCR Not Detected     Parainfluenza 3, PCR Not Detected     Parainfluenza 4, PCR Not Detected     RSV (Respiratory Syncytial Virus), PCR Not Detected     Bordetella parapertussis ([id number]), PCR Not Detected     Bordetella pertussis (ptxP), PCR Not Detected     Mycoplasma pneumoniae, PCR Not Detected     Chlamydia pneumoniae, PCR Not Detected            IMAGES:  DX-CHEST-PORTABLE (1 VIEW)   Final Result      No acute cardiopulmonary abnormality.         CT-CHEST (THORAX) W/O    (Results Pending)       CONSULTS:   na    ASSESSMENT/PLAN:   74 y.o. male admitted for hypoxia secondary to asthma exacerbation in setting of Human Metapneumovirus infection.     * Hypoxia- (present on admission)  Assessment & Plan  #asthma exacerbation  #Human Metapneumovirus infection    CBC unremarkable (no leukocytosis, no eosinophilia), cmp showing mild hypoK likely d/t multiple albuterol use, cxr negative, viral panel pos for Human Mentapneumovirus; overall responding to breathing tx; received Mg in ED x1; req o2 supp    Mild work of breathing, vss, overall stable.     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   -replenish K  -trend K          Lines/Tubes: piv  Lasix: na  Diet: reg  Abx: na  DVT prophylaxis: enox   Code Status: full    Dispo: admit to [facility] fm hosp service

## 2025-03-15 NOTE — ASSESSMENT & PLAN NOTE
On adm K is 3.3; likely d/t multiple use of albuterol in setting of asthma exacerbation.   -replenish K  -trend K

## 2025-03-15 NOTE — CARE PLAN
The patient is Stable - Low risk of patient condition declining or worsening    Shift Goals  Clinical Goals: Monitor O2 needs, pulmonary hygiene, perform resp panel  Patient Goals: rest. mobility, wean O2  Family Goals: Updates    Progress made toward(s) clinical / shift goals: Pt declined pain management at this time, educated on the different modalities of pain management. Respiratory panel completed. Pt doing nebulizer treatments per MAR with RT. Updated patient on care plan.     Problem: Knowledge Deficit - Standard  Goal: Patient and family/care givers will demonstrate understanding of plan of care, disease process/condition, diagnostic tests and medications  Outcome: Progressing     Problem: Communication  Goal: The ability to communicate needs accurately and effectively will improve  Outcome: Progressing     Problem: Respiratory  Goal: Patient will achieve/maintain optimum respiratory ventilation and gas exchange  Outcome: Progressing

## 2025-03-15 NOTE — ED NOTES
Medication history reviewed with patients daughter over phone.  Med rec is complete.  Allergies reviewed.     Patient took 1 dose of Azithromycin for a 1 day course this morning (3/14/25)    Anticoagulants taken in the last 14 days? No    Dispense history available in EPIC? Yes      Najma Mckeon PhT

## 2025-03-15 NOTE — PROGRESS NOTES
Approx 2110: Pt arrived to unit via hospital transport with wife and daughter at bedside. Pt able to ambulate to the bed without assistance, gait steady.   Report received from April RN.   Daughter at bedside able to translate for patient.   A&Ox4. Denies CP, currently on 2L of O2 via nasal cannula. Pt notes is SOB when coughing but that it has decreased since treatment in the ED.  Reporting 0/10 pain. Declined intervention at this time.  Educated patient regarding pharmacologic and non pharmacologic modalities for pain management.  Tolerating regular diet. Denies N/V.  + void. Last BM 3/14/25 PTA.   Pt ambulates with SBA. No fall risk per Kathy Adamson bed alarm off. Pt educated on use of call light for OOB and if assistance is required.   RT contacted to start nebulizer treatment.   SCD's refused  All needs met at this time. Call light within reach. Pt calls appropriately. Bed low and locked, non skid socks in place. Hourly rounding in place.

## 2025-03-16 LAB
ANION GAP SERPL CALC-SCNC: 8 MMOL/L (ref 7–16)
BUN SERPL-MCNC: 15 MG/DL (ref 8–22)
CALCIUM SERPL-MCNC: 8.7 MG/DL (ref 8.5–10.5)
CHLORIDE SERPL-SCNC: 106 MMOL/L (ref 96–112)
CO2 SERPL-SCNC: 25 MMOL/L (ref 20–33)
CREAT SERPL-MCNC: 0.7 MG/DL (ref 0.5–1.4)
ERYTHROCYTE [DISTWIDTH] IN BLOOD BY AUTOMATED COUNT: 46 FL (ref 35.9–50)
GFR SERPLBLD CREATININE-BSD FMLA CKD-EPI: 97 ML/MIN/1.73 M 2
GLUCOSE SERPL-MCNC: 106 MG/DL (ref 65–99)
HCT VFR BLD AUTO: 41.9 % (ref 42–52)
HGB BLD-MCNC: 13.8 G/DL (ref 14–18)
MCH RBC QN AUTO: 30.7 PG (ref 27–33)
MCHC RBC AUTO-ENTMCNC: 32.9 G/DL (ref 32.3–36.5)
MCV RBC AUTO: 93.1 FL (ref 81.4–97.8)
PLATELET # BLD AUTO: 148 K/UL (ref 164–446)
PMV BLD AUTO: 10.3 FL (ref 9–12.9)
POTASSIUM SERPL-SCNC: 4.1 MMOL/L (ref 3.6–5.5)
RBC # BLD AUTO: 4.5 M/UL (ref 4.7–6.1)
SODIUM SERPL-SCNC: 139 MMOL/L (ref 135–145)
WBC # BLD AUTO: 5.9 K/UL (ref 4.8–10.8)

## 2025-03-16 PROCEDURE — 99232 SBSQ HOSP IP/OBS MODERATE 35: CPT | Mod: GC | Performed by: FAMILY MEDICINE

## 2025-03-16 PROCEDURE — A9270 NON-COVERED ITEM OR SERVICE: HCPCS | Performed by: BEHAVIOR ANALYST

## 2025-03-16 PROCEDURE — 94640 AIRWAY INHALATION TREATMENT: CPT

## 2025-03-16 PROCEDURE — 770001 HCHG ROOM/CARE - MED/SURG/GYN PRIV*

## 2025-03-16 PROCEDURE — 700102 HCHG RX REV CODE 250 W/ 637 OVERRIDE(OP): Performed by: BEHAVIOR ANALYST

## 2025-03-16 PROCEDURE — 700101 HCHG RX REV CODE 250: Performed by: BEHAVIOR ANALYST

## 2025-03-16 PROCEDURE — 80048 BASIC METABOLIC PNL TOTAL CA: CPT

## 2025-03-16 PROCEDURE — A9270 NON-COVERED ITEM OR SERVICE: HCPCS

## 2025-03-16 PROCEDURE — 700111 HCHG RX REV CODE 636 W/ 250 OVERRIDE (IP): Performed by: BEHAVIOR ANALYST

## 2025-03-16 PROCEDURE — 85027 COMPLETE CBC AUTOMATED: CPT

## 2025-03-16 PROCEDURE — 36415 COLL VENOUS BLD VENIPUNCTURE: CPT

## 2025-03-16 PROCEDURE — 700102 HCHG RX REV CODE 250 W/ 637 OVERRIDE(OP)

## 2025-03-16 RX ORDER — FUROSEMIDE 20 MG/1
20 TABLET ORAL ONCE
Status: COMPLETED | OUTPATIENT
Start: 2025-03-16 | End: 2025-03-16

## 2025-03-16 RX ADMIN — IPRATROPIUM BROMIDE AND ALBUTEROL SULFATE 3 ML: .5; 2.5 SOLUTION RESPIRATORY (INHALATION) at 07:28

## 2025-03-16 RX ADMIN — PREDNISONE 50 MG: 50 TABLET ORAL at 05:37

## 2025-03-16 RX ADMIN — FUROSEMIDE 20 MG: 20 TABLET ORAL at 10:23

## 2025-03-16 RX ADMIN — IPRATROPIUM BROMIDE AND ALBUTEROL SULFATE 3 ML: .5; 2.5 SOLUTION RESPIRATORY (INHALATION) at 22:17

## 2025-03-16 RX ADMIN — MOMETASONE FUROATE AND FORMOTEROL FUMARATE DIHYDRATE 2 PUFF: 200; 5 AEROSOL RESPIRATORY (INHALATION) at 07:29

## 2025-03-16 RX ADMIN — IPRATROPIUM BROMIDE AND ALBUTEROL SULFATE 3 ML: .5; 2.5 SOLUTION RESPIRATORY (INHALATION) at 09:33

## 2025-03-16 RX ADMIN — GUAIFENESIN 1200 MG: 600 TABLET, EXTENDED RELEASE ORAL at 05:37

## 2025-03-16 RX ADMIN — GUAIFENESIN 1200 MG: 600 TABLET, EXTENDED RELEASE ORAL at 17:12

## 2025-03-16 RX ADMIN — IPRATROPIUM BROMIDE AND ALBUTEROL SULFATE 3 ML: .5; 2.5 SOLUTION RESPIRATORY (INHALATION) at 18:15

## 2025-03-16 RX ADMIN — IPRATROPIUM BROMIDE AND ALBUTEROL SULFATE 3 ML: .5; 2.5 SOLUTION RESPIRATORY (INHALATION) at 02:04

## 2025-03-16 RX ADMIN — IPRATROPIUM BROMIDE AND ALBUTEROL SULFATE 3 ML: .5; 2.5 SOLUTION RESPIRATORY (INHALATION) at 13:09

## 2025-03-16 RX ADMIN — MOMETASONE FUROATE AND FORMOTEROL FUMARATE DIHYDRATE 2 PUFF: 200; 5 AEROSOL RESPIRATORY (INHALATION) at 17:12

## 2025-03-16 NOTE — PROGRESS NOTES
Bedside report received.  Assessment complete.  A&O x 4. Patient calls appropriately.  Patient ambulates with no assist. Bed alarm off.   Patient has 0/10 pain. Patient medicated per MAR.  Denies N&V. Tolerating regular diet.  + void, + flatus, + BM.  Aggressive pulmonary hygiene.  SCD's refused.  Patient is pleasant and cooperative with the care plan. Family at bedside.  Review plan with of care with patient. Call light and personal belongings within reach. Hourly rounding in place. All needs met at this time.  /65   Pulse 65   Temp 37.1 °C (98.8 °F) (Temporal)   Resp 18   Ht 1.829 m (6')   Wt 85.3 kg (188 lb 0.8 oz)   SpO2 95%   BMI 25.50 kg/m²

## 2025-03-16 NOTE — DISCHARGE PLANNING
Care Transition Team Assessment    Patient is a 74 year old male admitted for hypoxia. Please see patient's H&P for prior medical history. RNCM met with patient at bedside to complete assessment. Patient lives with his spouse in a single story house with 2 steps to enter, Joann Zimmer (p)[phone]; emergency contact. No Advanced directive on file. Patient reports, prior to admission patient is independent with ADL's and IADL's. Patient does not use any DME at baseline. Patient reports, his family is good support for him. Patient receives monthly SSI deposits. Patient's PCP is Chas Younger. Patient's preferred pharmacy is [facility]. Patient denies a history of SNF/IPR nor HHC use in the past. Patient denies any SA or MH concerns. Patient confirmed medical coverage via Medicare. Patient has means to transportation and family will provide transport once medically stable for discharge.    DME nebulizer was ordered. Patient is currently on 1L of oxygen and may need oxygen upon discharge. RNCM obtained DME choice 1) [facility] 2) 2)[facility] and faxed to [facility].     Patient is needing a walking O2 and O2 order.     Patient will discharge to his daughter's house [address] [place] [address]    Information Source  Orientation Level: Oriented X4  Information Given By: Other (Comments) (daughter)  Who is responsible for making decisions for patient? : Patient    Readmission Evaluation  Is this a readmission?: Yes - unplanned readmission    Elopement Risk  Legal Hold: No  Ambulatory or Self Mobile in Wheelchair: Yes  Disoriented: No  Psychiatric Symptoms: None  History of Wandering: No  Elopement this Admit: No  Vocalizing Wanting to Leave: No  Displays Behaviors, Body Language Wanting to Leave: No-Not at Risk for Elopement  Elopement Risk: Not at Risk for Elopement    Interdisciplinary Discharge Planning  Lives with - Patient's Self Care Capacity: Spouse  Patient or legal guardian wants to designate a caregiver:  No  Support Systems: Spouse / Significant Other, Children  Housing / Facility: [address]  Patient Prefers to be Discharged to:: (Patient-Rptd) (P) Daughter's how  Durable Medical Equipment: (Patient-Rptd) (P) Not Applicable    Discharge Preparedness  What is your plan after discharge?: Home with help  What are your discharge supports?: Spouse, Child  Prior Functional Level: Ambulatory, Independent with Activities of Daily Living, Independent with Medication Management  Difficulity with ADLs: None  Difficulity with IADLs: None    Functional Assesment  Prior Functional Level: Ambulatory, Independent with Activities of Daily Living, Independent with Medication Management    Finances  Financial Barriers to Discharge: No  Prescription Coverage: Yes    Vision / Hearing Impairment  Vision Impairment : No  Hearing Impairment : No         Advance Directive  Advance Directive?: None  Advance Directive offered?: AD Booklet refused    Domestic Abuse  Have you ever been the victim of abuse or violence?: No  Possible Abuse/Neglect Reported to:: Not Applicable    Psychological Assessment  History of Substance Abuse: None  History of Psychiatric Problems: No  Non-compliant with Treatment: No  Newly Diagnosed Illness: No    Discharge Risks or Barriers  Discharge risks or barriers?: No  Patient risk factors: Complex medical needs    Anticipated Discharge Information  Discharge Disposition: Discharged to home/self care (01)

## 2025-03-16 NOTE — PROGRESS NOTES
Assumed care of patient at 1845. Bedside report received. Assessment complete.  Pt on isolation, droplet/contact, for human metapuemovirus.   Daughter at bedside able to translate for patient.   A&Ox4. Denies CP, currently on 2L of O2 via nasal cannula. States is breathing better, less SOB, less wheezing with treatment.  Reporting 0/10 pain. Declined intervention at this time.  Educated patient regarding pharmacologic and non pharmacologic modalities for pain management.  Tolerating regular diet. Denies N/V.  + void. + flatus, + BM. Last BM 3/15/25  Pt ambulates up self, gait steady. No fall risk per Kathy Adamson, bed alarm off.   SCD's refused  All needs met at this time. Call light within reach. Pt calls appropriately. Bed low and locked, non skid socks in place. Hourly rounding in place.

## 2025-03-16 NOTE — CARE PLAN
Problem: Bronchoconstriction  Goal: Improve in air movement and diminished wheezing  Description: Target End Date:  2 to 3 days1.  Implement inhaled treatments2.  Evaluate and manage medication effects  Outcome: Progressing   Duoneb Q4  Patient still wheezy

## 2025-03-16 NOTE — FACE TO FACE
"Face to Face Note  -  Durable Medical Equipment    Chas Younger M.D. - NPI: [id number]  I certify that this patient is under my care and that they had a durable medical equipment(DME)face to face encounter by myself and the clinical nurse specialist working collaboratively with me that meets the physician DME face-to-face encounter requirements with this patient on:    Date of encounter:   Patient:                    MRN:                       YOB: 2024  Fernando Grissom  [id number]  1950     The encounter with the patient was in whole, or in part, for the following medical condition, which is the primary reason for durable medical equipment:  Asthma    I certify that, based on my findings, the following durable medical equipment is medically necessary:    Oxygen   HOME O2 Saturation Measurements:(Values must be present for Home Oxygen orders)  Room air sat at rest: 88  Room air sat with amb: 85  With liters of O2: 2, O2 sat at rest with O2: 92  With Liters of O2: 4, O2 sat with amb with O2 : 93  Is the patient mobile?: Yes  If patient feels more short of breath, they can go up to 6 liters per minute and contact healthcare provider.    Supporting Symptoms: The patient requires supplemental oxygen, as the following interventions have been tried with limited or no improvement: \"Bronchodilators and/or steroid inhalers, \"Oral and/or IV steroids, \"Ambulation with oximetry, and \"Incentive spirometry.    My Clinical findings support the need for the above equipment due to:  Hypoxia  "

## 2025-03-16 NOTE — CARE PLAN
Problem: Knowledge Deficit - Standard  Goal: Patient and family/care givers will demonstrate understanding of plan of care, disease process/condition, diagnostic tests and medications  Outcome: Progressing     Problem: Communication  Goal: The ability to communicate needs accurately and effectively will improve  Outcome: Progressing     Problem: Respiratory  Goal: Patient will achieve/maintain optimum respiratory ventilation and gas exchange  Outcome: Progressing   The patient is Stable - Low risk of patient condition declining or worsening    Shift Goals  Clinical Goals: pulmonary hygiene  Patient Goals: rest  Family Goals: Updates    Progress made toward(s) clinical / shift goals:  patient working with incentive spirometer independently. RT working with patient.    Patient is not progressing towards the following goals:

## 2025-03-16 NOTE — CARE PLAN
The patient is Stable - Low risk of patient condition declining or worsening    Shift Goals  Clinical Goals: monitor respiratory status, pulmonary hygiene, IS use, ambulation, safety  Patient Goals: Rest  Family Goals: Updates    Progress made toward(s) clinical / shift goals: Pt assess and O2 monitored with pulse ox. Pt encouraged frequent ambulation and repositioning as tolerated. Pt doing nebulizer treatments with RT. Pt using IS, encouraged frequent use. Pt updated on care plan.    Problem: Knowledge Deficit - Standard  Goal: Patient and family/care givers will demonstrate understanding of plan of care, disease process/condition, diagnostic tests and medications  Outcome: Progressing     Problem: Communication  Goal: The ability to communicate needs accurately and effectively will improve  Outcome: Progressing     Problem: Respiratory  Goal: Patient will achieve/maintain optimum respiratory ventilation and gas exchange  Outcome: Progressing

## 2025-03-16 NOTE — PROGRESS NOTES
Attending: Anayeli Ferris M.d.   Senior Resident: Dr. Meade   Parish Resident: Chas Younger M.D.    PATIENT: Fernando Grissom; [id number]; 1950    Hospital Day # Hospital Day: 3    ID: 74 y.o. male admitted for hypoxia secondary to asthma exacerbation in setting of Human Metapneumovirus infection.     24 Hour Events: No acute events overnight.    Subjective: Patient states he is feeling well this morning, has no complaints.    OBJECTIVE:  Temp:  [36.6 °C (97.9 °F)-37.1 °C (98.8 °F)] 37.1 °C (98.8 °F)  Pulse:  [60-84] 65  Resp:  [16-18] 18  BP: (120-145)/(65-71) 127/65  SpO2:  [90 %-95 %] 95 %    Intake/Output Summary (Last 24 hours) at 3/15/2025 1409  Last data filed at 3/15/2025 0849  Gross per 24 hour   Intake 200 ml   Output --   Net 200 ml       PE:  Gen: No acute distress, resting comfortably in bed  HEENT: normocephalic, atraumatic, EOMI  Pulm: clear to auscultation bilaterally, no respiratory distress.  Expiratory wheezes and occasional crackles diffusely.  Cardio: RRR, no M/R/G  Abdom: soft, nontender, nondistended, normoactive bowel sounds in all quadrants  Ext: No edema, 2+ pulses bilaterally  Neuro: Grossly intact    LABS:  Recent Labs     03/14/25  1636 03/15/25  0027 03/16/25  0405   WBC 10.4 10.2 5.9   RBC 4.94 4.54* 4.50*   HEMOGLOBIN 15.0 14.2 13.8*   HEMATOCRIT 46.4 43.4 41.9*   MCV 93.9 95.6 93.1   MCH 30.4 31.3 30.7   RDW 45.7 47.7 46.0   PLATELETCT 181 155* 148*   MPV 10.5 10.5 10.3   NEUTSPOLYS 78.80* 72.60*  --    LYMPHOCYTES 12.00* 13.30*  --    MONOCYTES 8.60 13.30  --    EOSINOPHILS 0.00 0.00  --    BASOPHILS 0.10 0.10  --      Recent Labs     03/14/25  1636 03/15/25  0027 03/15/25  1026 03/16/25  0405   SODIUM 140 139 139 139   POTASSIUM 4.5 3.3* 5.0 4.1   CHLORIDE 104 101 103 106   CO2 24 22 25 25   BUN 19 16 15 15   CREATININE 0.76 0.78 0.90 0.70   CALCIUM 9.1 8.7 9.0 8.7   MAGNESIUM  --   --  2.6*  --    PHOSPHORUS  --   --  2.9  --    ALBUMIN 4.2  --   --   --   "    Estimated GFR/CRCL = Estimated Creatinine Clearance: 103.2 mL/min (by C-G formula based on SCr of 0.7 mg/dL).  Recent Labs     03/15/25  0027 03/15/25  1026 03/16/25  0405   GLUCOSE 157* 122* 106*     Recent Labs     03/14/25  1636   ASTSGOT 34   ALTSGPT 27   TBILIRUBIN 0.3   ALKPHOSPHAT 69   GLOBULIN 3.2             No results for input(s): \"INR\", \"APTT\", \"FIBRINOGEN\" in the last 72 hours.    Invalid input(s): \"DIMER\"    MICROBIOLOGY:   No results found for: \"BLOODCULTU\", \"BLDCULT\", \"BCHOLD\"     IMAGING:   CT-CHEST (THORAX) W/O   Final Result         1.  Minimal subtle scattered reticular opacities in bilateral upper lobes, could represent minimal component of atypical infiltrate   2.  Mildly enlarged mediastinal lymph nodes, increased somewhat since prior study. Consider causes of adenopathy with additional workup as clinically appropriate   3.  Atherosclerosis      Note: Evaluation is limited due to respiratory motion artifacts.      DX-CHEST-PORTABLE (1 VIEW)   Final Result      No acute cardiopulmonary abnormality.             MEDS:  Current Facility-Administered Medications   Medication Last Admin    mometasone-formoterol (Dulera) 200-5 MCG/ACT inhaler 2 Puff 2 Puff at 03/16/25 0729    guaiFENesin ER (Mucinex) tablet 1,200 mg 1,200 mg at 03/16/25 0537    ipratropium-albuterol (DUONEB) nebulizer solution 3 mL at 03/16/25 0933    enoxaparin (Lovenox) inj 40 mg 40 mg at 03/15/25 1724    acetaminophen (Tylenol) tablet 650 mg      Respiratory Therapy Consult      predniSONE (Deltasone) tablet 50 mg 50 mg at 03/16/25 0537    albuterol (Proventil) 2.5mg/0.5ml nebulizer solution 2.5 mg         ASSESSMENT/PLAN: 74 y.o. male with     * Hypoxia- (present on admission)  Assessment & Plan  #asthma exacerbation  #Human Metapneumovirus infection  CBC unremarkable (no leukocytosis, no eosinophilia), cmp showing mild hypoK likely d/t multiple albuterol use, cxr negative, viral panel pos for Human Mentapneumovirus; overall " responding to breathing tx; received Mg in ED x1; req o2 supp. CT with minimal subtle scattered reticular opacities in bilateral upper lobes.  Mildly enlarged mediastinal lymph node, increased from prior.    Plan:  -Continue DuoNebs  -cont prednisone course 50mg daily (3/15 would be day 4 out of 5)  -RT consulted  -continous pulse ox  -O2 supp, wean as tolerated  -resp hygiene  -contact precautions  -cont home alb inh prn  -cont home Dulera  -DME nebulizer ordered  -trial lasix 20mg PO, crackles    Hypokalemia  Assessment & Plan  On adm K is 3.3; likely d/t multiple use of albuterol in setting of asthma exacerbation.   -replenish K  -trend K          Core Measures:  Fluids: P.o.  Lines: PIV  Abx: None  Diet: Regular  PPX: SCDs/TEDs, Lovenox  Wound care: No wounds      #DISPOSITION  Inpatient for supplemental oxygen        Chas Younger M.D.   PGY1  [facility]

## 2025-03-17 ENCOUNTER — PHARMACY VISIT (OUTPATIENT)
Dept: PHARMACY | Facility: MEDICAL CENTER | Age: 75
End: 2025-03-17
Payer: COMMERCIAL

## 2025-03-17 VITALS
DIASTOLIC BLOOD PRESSURE: 67 MMHG | OXYGEN SATURATION: 90 % | TEMPERATURE: 98.2 F | HEIGHT: 72 IN | SYSTOLIC BLOOD PRESSURE: 149 MMHG | HEART RATE: 62 BPM | WEIGHT: 188.05 LBS | RESPIRATION RATE: 20 BRPM | BODY MASS INDEX: 25.47 KG/M2

## 2025-03-17 PROBLEM — E87.6 HYPOKALEMIA: Status: RESOLVED | Noted: 2025-03-15 | Resolved: 2025-03-17

## 2025-03-17 PROCEDURE — 700101 HCHG RX REV CODE 250: Performed by: BEHAVIOR ANALYST

## 2025-03-17 PROCEDURE — 99238 HOSP IP/OBS DSCHRG MGMT 30/<: CPT | Mod: GC | Performed by: FAMILY MEDICINE

## 2025-03-17 PROCEDURE — 700102 HCHG RX REV CODE 250 W/ 637 OVERRIDE(OP): Performed by: BEHAVIOR ANALYST

## 2025-03-17 PROCEDURE — RXMED WILLOW AMBULATORY MEDICATION CHARGE

## 2025-03-17 PROCEDURE — 700111 HCHG RX REV CODE 636 W/ 250 OVERRIDE (IP): Performed by: BEHAVIOR ANALYST

## 2025-03-17 PROCEDURE — 94640 AIRWAY INHALATION TREATMENT: CPT

## 2025-03-17 PROCEDURE — A9270 NON-COVERED ITEM OR SERVICE: HCPCS | Performed by: BEHAVIOR ANALYST

## 2025-03-17 RX ORDER — ALBUTEROL SULFATE 0.83 MG/ML
2.5 SOLUTION RESPIRATORY (INHALATION) EVERY 4 HOURS PRN
Qty: 30 EACH | Refills: 0 | Status: SHIPPED | OUTPATIENT
Start: 2025-03-17

## 2025-03-17 RX ORDER — ALBUTEROL SULFATE 5 MG/ML
2.5 SOLUTION RESPIRATORY (INHALATION) EVERY 4 HOURS PRN
Qty: 60 ML | Refills: 0 | Status: SHIPPED | OUTPATIENT
Start: 2025-03-17 | End: 2025-03-17

## 2025-03-17 RX ADMIN — MOMETASONE FUROATE AND FORMOTEROL FUMARATE DIHYDRATE 2 PUFF: 200; 5 AEROSOL RESPIRATORY (INHALATION) at 05:25

## 2025-03-17 RX ADMIN — PREDNISONE 50 MG: 50 TABLET ORAL at 05:25

## 2025-03-17 RX ADMIN — IPRATROPIUM BROMIDE AND ALBUTEROL SULFATE 3 ML: .5; 2.5 SOLUTION RESPIRATORY (INHALATION) at 15:41

## 2025-03-17 RX ADMIN — GUAIFENESIN 1200 MG: 600 TABLET, EXTENDED RELEASE ORAL at 18:00

## 2025-03-17 RX ADMIN — IPRATROPIUM BROMIDE AND ALBUTEROL SULFATE 3 ML: .5; 2.5 SOLUTION RESPIRATORY (INHALATION) at 02:15

## 2025-03-17 RX ADMIN — IPRATROPIUM BROMIDE AND ALBUTEROL SULFATE 3 ML: .5; 2.5 SOLUTION RESPIRATORY (INHALATION) at 07:25

## 2025-03-17 RX ADMIN — IPRATROPIUM BROMIDE AND ALBUTEROL SULFATE 3 ML: .5; 2.5 SOLUTION RESPIRATORY (INHALATION) at 10:29

## 2025-03-17 RX ADMIN — GUAIFENESIN 1200 MG: 600 TABLET, EXTENDED RELEASE ORAL at 05:25

## 2025-03-17 NOTE — PROGRESS NOTES
Bedside report received.  Assessment complete.    A&O x 4. Patient calls appropriately.  Patient ambulates with standby to no assist. Bed alarm off.   Patient has 0/10 pain. No pharmacological interventions provided at this time. Patient medicated per MAR.  Denies N&V. Tolerating regular diet.  + void, + flatus, + BM, last BM 3/16 per pt.  Patient denies SOB.  SCD's refused, patient ambulatory.    Review plan with of care with patient. Call light and personal belongings within reach. Hourly rounding in place. All needs met at this time.

## 2025-03-17 NOTE — CARE PLAN
The patient is Stable - Low risk of patient condition declining or worsening    Problem: Knowledge Deficit - Standard  Goal: Patient and family/care givers will demonstrate understanding of plan of care, disease process/condition, diagnostic tests and medications  Outcome: Progressing     Problem: Respiratory  Goal: Patient will achieve/maintain optimum respiratory ventilation and gas exchange  Outcome: Progressing     Shift Goals  Clinical Goals: pulmonary hygiene, rest, comfort  Patient Goals: rest, comfort  Family Goals: Updates    Progress made toward(s) clinical / shift goals:  Patient medicated per MAR. IS encouraged while awake. Respiratory therapy following. Patient able to rest during this shift.     Patient is not progressing towards the following goals:

## 2025-03-17 NOTE — PROGRESS NOTES
Bedside report received.  Assessment complete.  A&O x 4. Patient calls appropriately.  Patient ambulates with no assist. Bed alarm off.   Patient has 0/10 pain. Patient medicated per MAR.  Denies N&V. Tolerating regular diet.  + void, + flatus, + BM.  Patient requires supplemental oxygen.  SCD's off, patient ambulates ad li.  Patient is pleasant and cooperative with the care plan. Family at bedside.  Review plan with of care with patient. Call light and personal belongings within reach. Hourly rounding in place. All needs met at this time.  /74   Pulse 65   Temp 36.7 °C (98.1 °F) (Temporal)   Resp 18   Ht 1.829 m (6')   Wt 85.3 kg (188 lb 0.8 oz)   SpO2 90%   BMI 25.50 kg/m²

## 2025-03-17 NOTE — CARE PLAN
Problem: Knowledge Deficit - Standard  Goal: Patient and family/care givers will demonstrate understanding of plan of care, disease process/condition, diagnostic tests and medications  3/17/2025 1556 by Kd Anne R.N.  Outcome: Met  3/17/2025 0906 by Kd Anne R.N.  Outcome: Progressing     Problem: Communication  Goal: The ability to communicate needs accurately and effectively will improve  3/17/2025 1556 by Kd Anne R.N.  Outcome: Met  3/17/2025 0906 by Kd Anne R.N.  Outcome: Progressing     Problem: Respiratory  Goal: Patient will achieve/maintain optimum respiratory ventilation and gas exchange  3/17/2025 1556 by Kd Anne R.N.  Outcome: Met  3/17/2025 0906 by Kd Anne R.N.  Outcome: Progressing     Problem: Bronchoconstriction  Goal: Improve in air movement and diminished wheezing  Outcome: Met

## 2025-03-17 NOTE — CARE PLAN
Problem: Knowledge Deficit - Standard  Goal: Patient and family/care givers will demonstrate understanding of plan of care, disease process/condition, diagnostic tests and medications  Outcome: Progressing     Problem: Communication  Goal: The ability to communicate needs accurately and effectively will improve  Outcome: Progressing     Problem: Respiratory  Goal: Patient will achieve/maintain optimum respiratory ventilation and gas exchange  Outcome: Progressing   The patient is Stable - Low risk of patient condition declining or worsening    Shift Goals  Clinical Goals: pulmonary hygiene, ambulate  Patient Goals: rest  Family Goals: Updates    Progress made toward(s) clinical / shift goals:  Patient working with IS independently, ambulating ad richardson    Patient is not progressing towards the following goals:

## 2025-03-17 NOTE — DISCHARGE PLANNING
Case Management Discharge Planning    Admission Date: 3/14/2025  GMLOS: 2.1  ALOS: 3    6-Clicks ADL Score: 24  6-Clicks Mobility Score: 24      Anticipated Discharge Dispo: Discharge Disposition: Discharged to home/self care (01)    DME Needed: Yes    DME Ordered: Yes    Action(s) Taken: LMSW called [facility] and spoke to Zoya to see if they received the referral from the [facility]. Zoya reported they did. I explained to Zoya that pt will DC to daughters home in [place] for a few days until returning to [place]. Zoya reported that daughter might have to go to [facility] and  equipment, however, Zoya said she would call LMSW back once she speaks with the [facility].    LMSW got physical address for pt in [place]- [address] [place] [address]. [place], [place]. Address for daughter where pt will DC to is in previous CM note.    1236  LMSW called [facility] to see if coordination has been made with [facility]. Benjie reported that they have spoke and pt's equipment is being loaded on the van right now and all equipment will be delivered to bedside. LMSW asked Benjie about the nebulizer. Benjie reported she did not get a nebulizer order but if this LMSW fax her the order she can add it to equipment being delivered to bedside.   LMSW faxed nebulizer order to [facility] [phone]  Family to take equipment to [place] to pt home once he goes back home.    Escalations Completed: None    Medically Clear: No    Next Steps: LMSW will continue to assist with DC needs/barriers.    Barriers to Discharge: oxygen delivery    Is the patient up for discharge tomorrow: Yes    Is transport arranged for discharge disposition: Yes

## 2025-03-17 NOTE — DISCHARGE PLANNING
DME oxygen referral sent to [facility].    0928  DPA sent hard fax oxygen order to [facility] at [phone].

## 2025-03-18 NOTE — DISCHARGE SUMMARY
[facility] FAMILY MEDICINE    Admit Date:  3/14/2025       Discharge Date: 3/17/2025    Service:   [facility] Family Medicine Inpatient Team  Attending Physician(s):   Anayeli Ferris M.d.        Senior Resident(s):   Dr. Meade  Parish Resident(s):   Chas Younger M.D.    Primary Diagnosis:   Principal Problem:    Hypoxia (POA: Yes)  Resolved Problems:    Hypokalemia (POA: Unknown)       HPI (Per Dr. Juares's Admission H&P):     Fernando Grissom is a 74 y.o. male w/ pmh of childhood asthma who was discharged from the hospital yesterday after a 1d hospitalization for w/ prelim dx of asthma (vs less likely copd) c/b exacerbation w/ hypoxia, now returns to hospital as his daughter who is a nurse witnessed him having significant wheezing and slight work of breathing. He was d/c on albuterol inhaler prn and ics/laba inhaler. He has hx of pna and bronchitis in the winter season d/t URI's per his daughter at bedside.      In the past few days he has been suffering from URI sx, most likely viral in nature as cxr neg, no cxr of focal pna, procalc neg, and no leukocytosis. Did have fevers, but none since discharging and overall URI sx improving slightly.      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      This pt is very pleasant, but stoic in nature, therefore he does not complain much of sx altho signs of respiratory concerns are present.      ERCourse:  CBC unremarkable, cmp showing mild hypoK likely d/t albuterol use, cxr negative, viral panel pos for Human Mentapneumovirus    Hospital Summary (Brief Narrative):       Fernando Grissom was admitted to [facility] on 3/14/2025 for management of acute asthma exacerbation secondary to human metapneumovirus.      Patient was admitted to the floor started on supplemental oxygen.  Scheduled DuoNebs, albuterol as needed, RT consulted, continue  prednisone course, and potassium replenished on an admission.  His home Dulera was also continued.  Patient moderately improved throughout his stay, wheezing improved and his breathing is better.  Unfortunately overnight able to wean patient completely off his oxygen he still requiring 1 L at rest and 2 L while ambulating.  Face-to-face and home oxygen was put in.  He completed his steroid course.  On the day of discharge, patient is feeling well and has no acute complaints.  Wheezing is improved, but still present.  He is to continue his home Dulera and albuterol every 4 hours as needed while home.  Therefore he is discharged with home oxygen to the care of his daughters in stable condition.    Consultants:      None    Procedures:        None    Labs:  Results for orders placed or performed during the hospital encounter of 25   EKG    Collection Time: 25  4:07 PM   Result Value Ref Range    Report       [facility] Emergency Dept.    Test Date:  2025  Pt Name:    NANCY CENTENO         Department: ER  MRN:        [id number]                      Room:  Gender:     Male                         Technician: [id number]  :        1950                   Requested By:ER TRIAGE PROTOCOL  Order #:    [id number]                    Reading MD:    Measurements  Intervals                                Axis  Rate:       57                           P:          65  MA:         130                          QRS:        68  QRSD:       117                          T:          38  QT:         443  QTc:        432    Interpretive Statements  Sinus bradycardia  Nonspecific intraventricular conduction delay  Borderline low voltage, extremity leads  Compared to ECG 2025 17:25:55  Intraventricular conduction delay now present  Sinus rhythm no longer present  Atrial premature complex(es) no longer present     CBC with Differential    Collection Time: 25  4:36 PM   Result Value Ref  Range    WBC 10.4 4.8 - 10.8 K/uL    RBC 4.94 4.70 - 6.10 M/uL    Hemoglobin 15.0 14.0 - 18.0 g/dL    Hematocrit 46.4 42.0 - 52.0 %    MCV 93.9 81.4 - 97.8 fL    MCH 30.4 27.0 - 33.0 pg    MCHC 32.3 32.3 - 36.5 g/dL    RDW 45.7 35.9 - 50.0 fL    Platelet Count 181 164 - 446 K/uL    MPV 10.5 9.0 - 12.9 fL    Neutrophils-Polys 78.80 (H) 44.00 - 72.00 %    Lymphocytes 12.00 (L) 22.00 - 41.00 %    Monocytes 8.60 0.00 - 13.40 %    Eosinophils 0.00 0.00 - 6.90 %    Basophils 0.10 0.00 - 1.80 %    Immature Granulocytes 0.50 0.00 - 0.90 %    Nucleated RBC 0.00 0.00 - 0.20 /100 WBC    Neutrophils (Absolute) 8.16 (H) 1.82 - 7.42 K/uL    Lymphs (Absolute) 1.24 1.00 - 4.80 K/uL    Monos (Absolute) 0.89 (H) 0.00 - 0.85 K/uL    Eos (Absolute) 0.00 0.00 - 0.51 K/uL    Baso (Absolute) 0.01 0.00 - 0.12 K/uL    Immature Granulocytes (abs) 0.05 0.00 - 0.11 K/uL    NRBC (Absolute) 0.00 K/uL   Comp Metabolic Panel    Collection Time: 03/14/25  4:36 PM   Result Value Ref Range    Sodium 140 135 - 145 mmol/L    Potassium 4.5 3.6 - 5.5 mmol/L    Chloride 104 96 - 112 mmol/L    Co2 24 20 - 33 mmol/L    Anion Gap 12.0 7.0 - 16.0    Glucose 139 (H) 65 - 99 mg/dL    Bun 19 8 - 22 mg/dL    Creatinine 0.76 0.50 - 1.40 mg/dL    Calcium 9.1 8.5 - 10.5 mg/dL    Correct Calcium 8.9 8.5 - 10.5 mg/dL    AST(SGOT) 34 12 - 45 U/L    ALT(SGPT) 27 2 - 50 U/L    Alkaline Phosphatase 69 30 - 99 U/L    Total Bilirubin 0.3 0.1 - 1.5 mg/dL    Albumin 4.2 3.2 - 4.9 g/dL    Total Protein 7.4 6.0 - 8.2 g/dL    Globulin 3.2 1.9 - 3.5 g/dL    A-G Ratio 1.3 g/dL   proBrain Natriuretic Peptide, NT    Collection Time: 03/14/25  4:36 PM   Result Value Ref Range    NT-proBNP 254 (H) 0 - 125 pg/mL   Troponin    Collection Time: 03/14/25  4:36 PM   Result Value Ref Range    Troponin T 7 6 - 19 ng/L   ESTIMATED GFR    Collection Time: 03/14/25  4:36 PM   Result Value Ref Range    GFR (CKD-EPI) 95 >60 mL/min/1.73 m 2   POC CoV-2, FLU A/B, RSV by PCR    Collection Time:  25  5:46 PM   Result Value Ref Range    POC Influenza A RNA, PCR Negative Negative    POC Influenza B RNA, PCR Negative Negative    POC RSV, by PCR Negative Negative    POC SARS-CoV-2, PCR NotDetected NotDetected   EKG    Collection Time: 25  7:49 PM   Result Value Ref Range    Report       [facility] Emergency Dept.    Test Date:  2025  Pt Name:    NANCY CENTENO         Department: ER  MRN:        [id number]                      Room:  Gender:     Male                         Technician: [id number]  :        1950                   Requested By:ER TRIAGE PROTOCOL  Order #:    [id number]                    Reading MD: OLE MORROW MD    Measurements  Intervals                                Axis  Rate:       57                           P:          65  VT:         130                          QRS:        68  QRSD:       117                          T:          38  QT:         443  QTc:        432    Interpretive Statements  Sinus bradycardia, Rate of 57, normal axis, no ST elevation  Nonspecific intraventricular conduction delay  Borderline low voltage, extremity leads  Compared to ECG 2025 17:25:55  Intraventricular conduction delay now present  Sinus rhythm no longer present  Atrial premature complex(es) no longer present   Electronically Signed On 2025 19:49:57 PDT by OLE MORROW MD     Respiratory Panel by PCR (Inpatient ONLY)    Collection Time: 25 11:55 PM    Specimen: Nasopharyngeal; Respirate   Result Value Ref Range    Adenovirus, PCR Not Detected     SARS-CoV-2 (COVID-19) RNA by PETTY NotDetected     Coronavirus 229E, PCR Not Detected     Coronavirus HKU1, PCR Not Detected     Coronavirus NL63, PCR Not Detected     Coronavirus OC43, PCR Not Detected     Human Metapneumovirus, PCR DETECTED (A)     Rhino/Enterovirus, PCR Not Detected     Influenza A, PCR Not Detected     Influenza B, PCR Not Detected     Parainfluenza 1, PCR Not  Detected     Parainfluenza 2, PCR Not Detected     Parainfluenza 3, PCR Not Detected     Parainfluenza 4, PCR Not Detected     RSV (Respiratory Syncytial Virus), PCR Not Detected     Bordetella parapertussis (YG6133), PCR Not Detected     Bordetella pertussis (ptxP), PCR Not Detected     Mycoplasma pneumoniae, PCR Not Detected     Chlamydia pneumoniae, PCR Not Detected    CBC with Differential    Collection Time: 03/15/25 12:27 AM   Result Value Ref Range    WBC 10.2 4.8 - 10.8 K/uL    RBC 4.54 (L) 4.70 - 6.10 M/uL    Hemoglobin 14.2 14.0 - 18.0 g/dL    Hematocrit 43.4 42.0 - 52.0 %    MCV 95.6 81.4 - 97.8 fL    MCH 31.3 27.0 - 33.0 pg    MCHC 32.7 32.3 - 36.5 g/dL    RDW 47.7 35.9 - 50.0 fL    Platelet Count 155 (L) 164 - 446 K/uL    MPV 10.5 9.0 - 12.9 fL    Neutrophils-Polys 72.60 (H) 44.00 - 72.00 %    Lymphocytes 13.30 (L) 22.00 - 41.00 %    Monocytes 13.30 0.00 - 13.40 %    Eosinophils 0.00 0.00 - 6.90 %    Basophils 0.10 0.00 - 1.80 %    Immature Granulocytes 0.70 0.00 - 0.90 %    Nucleated RBC 0.00 0.00 - 0.20 /100 WBC    Neutrophils (Absolute) 7.39 1.82 - 7.42 K/uL    Lymphs (Absolute) 1.36 1.00 - 4.80 K/uL    Monos (Absolute) 1.36 (H) 0.00 - 0.85 K/uL    Eos (Absolute) 0.00 0.00 - 0.51 K/uL    Baso (Absolute) 0.01 0.00 - 0.12 K/uL    Immature Granulocytes (abs) 0.07 0.00 - 0.11 K/uL    NRBC (Absolute) 0.00 K/uL   Basic Metabolic Panel (BMP)    Collection Time: 03/15/25 12:27 AM   Result Value Ref Range    Sodium 139 135 - 145 mmol/L    Potassium 3.3 (L) 3.6 - 5.5 mmol/L    Chloride 101 96 - 112 mmol/L    Co2 22 20 - 33 mmol/L    Glucose 157 (H) 65 - 99 mg/dL    Bun 16 8 - 22 mg/dL    Creatinine 0.78 0.50 - 1.40 mg/dL    Calcium 8.7 8.5 - 10.5 mg/dL    Anion Gap 16.0 7.0 - 16.0   ESTIMATED GFR    Collection Time: 03/15/25 12:27 AM   Result Value Ref Range    GFR (CKD-EPI) 94 >60 mL/min/1.73 m 2   PHOSPHORUS    Collection Time: 03/15/25 10:26 AM   Result Value Ref Range    Phosphorus 2.9 2.5 - 4.5 mg/dL    MAGNESIUM    Collection Time: 03/15/25 10:26 AM   Result Value Ref Range    Magnesium 2.6 (H) 1.5 - 2.5 mg/dL   Basic Metabolic Panel    Collection Time: 03/15/25 10:26 AM   Result Value Ref Range    Sodium 139 135 - 145 mmol/L    Potassium 5.0 3.6 - 5.5 mmol/L    Chloride 103 96 - 112 mmol/L    Co2 25 20 - 33 mmol/L    Glucose 122 (H) 65 - 99 mg/dL    Bun 15 8 - 22 mg/dL    Creatinine 0.90 0.50 - 1.40 mg/dL    Calcium 9.0 8.5 - 10.5 mg/dL    Anion Gap 11.0 7.0 - 16.0   ESTIMATED GFR    Collection Time: 03/15/25 10:26 AM   Result Value Ref Range    GFR (CKD-EPI) 90 >60 mL/min/1.73 m 2   Basic Metabolic Panel    Collection Time: 03/16/25  4:05 AM   Result Value Ref Range    Sodium 139 135 - 145 mmol/L    Potassium 4.1 3.6 - 5.5 mmol/L    Chloride 106 96 - 112 mmol/L    Co2 25 20 - 33 mmol/L    Glucose 106 (H) 65 - 99 mg/dL    Bun 15 8 - 22 mg/dL    Creatinine 0.70 0.50 - 1.40 mg/dL    Calcium 8.7 8.5 - 10.5 mg/dL    Anion Gap 8.0 7.0 - 16.0   CBC WITHOUT DIFFERENTIAL    Collection Time: 03/16/25  4:05 AM   Result Value Ref Range    WBC 5.9 4.8 - 10.8 K/uL    RBC 4.50 (L) 4.70 - 6.10 M/uL    Hemoglobin 13.8 (L) 14.0 - 18.0 g/dL    Hematocrit 41.9 (L) 42.0 - 52.0 %    MCV 93.1 81.4 - 97.8 fL    MCH 30.7 27.0 - 33.0 pg    MCHC 32.9 32.3 - 36.5 g/dL    RDW 46.0 35.9 - 50.0 fL    Platelet Count 148 (L) 164 - 446 K/uL    MPV 10.3 9.0 - 12.9 fL   ESTIMATED GFR    Collection Time: 03/16/25  4:05 AM   Result Value Ref Range    GFR (CKD-EPI) 97 >60 mL/min/1.73 m 2       Imaging/ Testing:      CT-CHEST (THORAX) W/O   Final Result         1.  Minimal subtle scattered reticular opacities in bilateral upper lobes, could represent minimal component of atypical infiltrate   2.  Mildly enlarged mediastinal lymph nodes, increased somewhat since prior study. Consider causes of adenopathy with additional workup as clinically appropriate   3.  Atherosclerosis      Note: Evaluation is limited due to respiratory motion artifacts.       DX-CHEST-PORTABLE (1 VIEW)   Final Result      No acute cardiopulmonary abnormality.             Physical Exam:  Constitutional: Alert, no distress, well-groomed.  Skin: Warm, dry, good turgor, no rashes in visible areas.  HEENT: Normocephalic, atraumatic, EOMI, neck supple, moist mucous membranes  Cardiovascular: Regular rate and rhythm, no murmurs rubs or gallops  Respiratory: Unlabored respiratory effort, no cough, CTAB.  Inspiratory and expiratory wheezes present diffusely  MSK: Moves all extremities.  Neuro: Grossly intact, no focal deficits  Psych: Normal affect and mood.    Discharge Medications:           Medication List        START taking these medications        Instructions   mometasone-formoterol 200-5 MCG/ACT Aero  Commonly known as: Dulera   Inhale 2 Puffs 2 times a day.  Dose: 2 Puff            CHANGE how you take these medications        Instructions   * albuterol 108 (90 Base) MCG/ACT Aers inhalation aerosol  What changed: Another medication with the same name was added. Make sure you understand how and when to take each.   Inhale 2 Puffs every 4 hours.  Dose: 2 Puff     * albuterol 2.5mg/3ml Nebu solution for nebulization  What changed: You were already taking a medication with the same name, and this prescription was added. Make sure you understand how and when to take each.  Commonly known as: Proventil   Take 3 mL by nebulization every four hours as needed for Shortness of Breath.  Dose: 2.5 mg           * This list has 2 medication(s) that are the same as other medications prescribed for you. Read the directions carefully, and ask your doctor or other care provider to review them with you.                CONTINUE taking these medications        Instructions   acetaminophen 325 MG Tabs  Commonly known as: Tylenol   Take 2 Tablets by mouth every four hours as needed for Fever.  Dose: 650 mg     DERECK-SELTZER PLUS COLD & FLU PO   Take 1 Tablet by mouth every four hours as needed (cold/  flu).  Dose: 1 Tablet     Guaifenesin 1200 MG Tb12   Take 1 Tablet by mouth every 12 hours.  Dose: 1,200 mg     PSEUDOEPHEDRINE HCL PO   Take 2 Tablets by mouth every four hours as needed for Congestion.  Dose: 2 Tablet            STOP taking these medications      azithromycin 500 MG tablet  Commonly known as: Zithromax     predniSONE 10 MG Tabs  Commonly known as: Deltasone     predniSONE 50 MG Tabs  Commonly known as: Deltasone                Disposition: Home with close outpatient follow-up.    Diet: Regular, as tolerated    Activity: As tolerated    Instructions:      -Follow-up with PCP  The patient was instructed to return to the ER in the event of worsening symptoms. I have counseled the patient on the importance of compliance and the patient has agreed to proceed with all medical recommendations and follow up plan indicated above.   The patient understands that all medications come with benefits and risks. Risks may include permanent injury or death and these risks can be minimized with close reassessment and monitoring.        Please CC: Chas Younger M.D.    Follow up appointment details :        Future Appointments   Date Time Provider Department Center   3/19/2025  8:30 AM Chas Younger M.D. [facility]       Follow up with Primary Care Physician within 7 days of discharge for further evaluation and care     Pending Studies:        None    The patient met the 2-midnight criteria for an inpatient stay at the time of discharge.     Chas Younger M.D.  PGY1  [facility]

## 2025-03-19 ENCOUNTER — OFFICE VISIT (OUTPATIENT)
Dept: MEDICAL GROUP | Facility: CLINIC | Age: 75
End: 2025-03-19
Payer: MEDICARE

## 2025-03-19 VITALS
TEMPERATURE: 97.3 F | BODY MASS INDEX: 27.35 KG/M2 | OXYGEN SATURATION: 94 % | SYSTOLIC BLOOD PRESSURE: 112 MMHG | HEIGHT: 70 IN | DIASTOLIC BLOOD PRESSURE: 72 MMHG | HEART RATE: 68 BPM | WEIGHT: 191 LBS

## 2025-03-19 DIAGNOSIS — Z85.72 HX OF LYMPHOMA: ICD-10-CM

## 2025-03-19 DIAGNOSIS — N40.0 BENIGN PROSTATIC HYPERPLASIA WITHOUT LOWER URINARY TRACT SYMPTOMS: ICD-10-CM

## 2025-03-19 DIAGNOSIS — Z23 NEED FOR VACCINATION: ICD-10-CM

## 2025-03-19 DIAGNOSIS — Z12.11 COLON CANCER SCREENING: ICD-10-CM

## 2025-03-19 DIAGNOSIS — J45.909 MODERATE ASTHMA, UNSPECIFIED WHETHER COMPLICATED, UNSPECIFIED WHETHER PERSISTENT: Primary | ICD-10-CM

## 2025-03-19 DIAGNOSIS — E78.5 HYPERLIPIDEMIA, UNSPECIFIED HYPERLIPIDEMIA TYPE: ICD-10-CM

## 2025-03-19 PROBLEM — G89.18 ACUTE POST-OPERATIVE PAIN: Status: RESOLVED | Noted: 2019-12-26 | Resolved: 2025-03-19

## 2025-03-19 PROCEDURE — G0009 ADMIN PNEUMOCOCCAL VACCINE: HCPCS | Mod: GC

## 2025-03-19 PROCEDURE — 99213 OFFICE O/P EST LOW 20 MIN: CPT | Mod: 25,GE

## 2025-03-19 PROCEDURE — 3078F DIAST BP <80 MM HG: CPT | Mod: GC

## 2025-03-19 PROCEDURE — 90715 TDAP VACCINE 7 YRS/> IM: CPT | Mod: GC

## 2025-03-19 PROCEDURE — 3074F SYST BP LT 130 MM HG: CPT | Mod: GC

## 2025-03-19 PROCEDURE — 90472 IMMUNIZATION ADMIN EACH ADD: CPT | Mod: GC

## 2025-03-19 PROCEDURE — 90677 PCV20 VACCINE IM: CPT | Mod: GC

## 2025-03-19 ASSESSMENT — FIBROSIS 4 INDEX: FIB4 SCORE: 3.23

## 2025-03-19 NOTE — PROGRESS NOTES
Subjective:     CC:   Chief Complaint   Patient presents with    Follow-Up       HPI:   Fernando Grissom is a 74 y.o. male who presents with his daughter and wife, to establish care and for hospital follow-up.    Problem   Bph (Benign Prostatic Hyperplasia)    S/p TURP around 2016 per daughter. Been cleared by urology.     Moderate Asthma    Patient was just recently admitted with asthma exacerbation secondary to human metapneumovirus on 3/12 to 3/13 and again on 3/14 and 3/18.  He was discharged on Dulera and albuterol nebulizers as needed.  Unfortunately, patient was not able to completely wean off of oxygen he was discharged with 1 L oxygen while at rest and 2 L while ambulating.    Since discharging, patient states he is doing very well and has no complaints.  Daughter states his wheezing has markedly improved and almost completely resolved.  She is a nurse and has been weaning his oxygen as able at home.  He is now down to only needing half a liter with ambulation.  Has resumed his normal activities at home, but has not resumed work activities quite yet.     Hx of Lymphoma    In remission. Cleared by oncology.     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  He   has a past surgical history that includes tonsillectomy (9/17/2010); turp-vapor (2017); other orthopedic surgery; bone marrow aspiration (10/26/2010); bone marrow biopsy, ndl/trocar (10/26/2010); mass excision general (Left, 12/26/2019); and lesion excision ortho (Left, 12/26/2019).    No family history on file.    Social History     Tobacco Use    Smoking status: Never    Smokeless tobacco: Never   Vaping Use    Vaping status: Never Used   Substance Use Topics    Alcohol use: No    Drug use: No       Patient Active Problem List    Diagnosis Date Noted    BPH (benign prostatic hyperplasia) 03/19/2025    Moderate asthma 03/19/2025    Hypoxia 03/12/2025    Mass of skin of left shoulder 12/26/2019    Hyperlipidemia, unspecified 12/09/2019    Hx of lymphoma 11/08/2019    Soft tissue mass 11/08/2019         Current Outpatient Medications   Medication Sig Dispense Refill    mometasone-formoterol (DULERA) 200-5 MCG/ACT Aerosol Inhale 2 Puffs 2 times a day. 26 g 0    albuterol (PROVENTIL) 2.5mg/3ml Nebu Soln solution for nebulization Take 3 mL by nebulization every four hours as needed for Shortness of Breath. 30 Each 0    acetaminophen (TYLENOL) 325 MG Tab Take 2 Tablets by mouth every four hours as needed for Fever. 30 Tablet 0    albuterol 108 (90 Base) MCG/ACT Aero Soln inhalation aerosol Inhale 2 Puffs every 4 hours. 34 g 3    Guaifenesin 1200 MG TABLET SR 12 HR Take 1 Tablet by mouth every 12 hours. 28 Tablet 3    PSEUDOEPHEDRINE HCL PO Take 2 Tablets by mouth every four hours as needed for Congestion.      [id number]-APAP (DERECK-SELTZER PLUS COLD & FLU PO) Take 1 Tablet by mouth every four hours as needed (cold/ flu).       No current facility-administered medications for this visit.     No Known Allergies    Review of Systems   Constitutional: Negative for fever, chills and malaise/fatigue.   HENT: Negative for congestion.   "  Eyes: Negative for pain.    Respiratory: Negative for cough and shortness of breath.  Cardiovascular: Negative for leg swelling.   Gastrointestinal: Negative for nausea, vomiting, abdominal pain and diarrhea.   Genitourinary: Negative for dysuria and hematuria.   Skin: Negative for rash.   Neurological: Negative for dizziness, focal weakness and headaches.   Endo/Heme/Allergies: Does not bleed easily.   Psychiatric/Behavioral: Negative for depression.  The patient is not nervous/anxious.      Objective:     /72   Pulse 68   Temp 36.3 °C (97.3 °F) (Temporal)   Ht 1.778 m (5' 10\")   Wt 86.6 kg (191 lb)   SpO2 94% Comment: 0.5L of O2  BMI 27.41 kg/m²   Body mass index is 27.41 kg/m².  Wt Readings from Last 4 Encounters:   03/19/25 86.6 kg (191 lb)   03/15/25 85.3 kg (188 lb 0.8 oz)   03/12/25 85.7 kg (189 lb)   03/12/25 85.7 kg (189 lb)       Physical Exam:  Constitutional: Well-developed and well-nourished. Not diaphoretic. No distress.   Skin: Skin is warm and dry. No rash noted.  Head: Atraumatic without lesions.  Eyes: Conjunctivae and extraocular motions are normal. Pupils are equal, round, and reactive to light. No scleral icterus.   Ears:  External ears unremarkable. Tympanic membranes clear and intact.  Nose: Nares patent. Septum midline. Turbinates without erythema nor edema. No discharge.   Mouth/Throat: Dentition is good. Tongue normal. Oropharynx is clear and moist. Posterior pharynx without erythema or exudates.  Neck: Supple, trachea midline. Normal range of motion. No thyromegaly present. No lymphadenopathy--cervical or supraclavicular.  Cardiovascular: Regular rate and rhythm, S1 and S2 without murmur, rubs, or gallops.  Lungs: Normal inspiratory effort, CTA bilaterally, no wheezes/rhonchi/rales. Mildly course in lung bases  Abdomen: Soft, non tender, and without distention. Active bowel sounds in all four quadrants. No rebound, guarding, masses or HSM.  Extremities: No cyanosis, clubbing, " erythema, nor edema. Distal pulses intact and symmetric.   Musculoskeletal: All major joints AROM full in all directions without pain.  Neurological: Alert and oriented x 3. DTRs 2+/3 and symmetric. No cranial nerve deficit. 5/5 myotomes. Sensation intact.   Psychiatric:  Behavior, mood, and affect are appropriate.      Assessment and Plan:     Problem List Items Addressed This Visit       Hx of lymphoma    Relevant Orders    CBC WITH DIFFERENTIAL    Hyperlipidemia, unspecified    Relevant Orders    Lipid Profile    BPH (benign prostatic hyperplasia)    Moderate asthma - Primary    Doing well post discharge.  Question if there may be a component of COPD given long history of exposure as a .  No smoking history.  -Continue Dulera  -Continue albuterol inhaler and nebulizer as needed  -Continue home oxygen  -PFTs ordered         Relevant Orders    PULMONARY FUNCTION TESTS -Test requested: Complete Pulmonary Function Test     Other Visit Diagnoses         Need for vaccination        Relevant Orders    Tdap Vaccine =>6YO IM (Completed)    Pneumococcal Conjugate Vaccine 20-Valent (6 wks+) (Completed)      Colon cancer screening        Relevant Orders    Cologuard® colon cancer screening             Labs per orders  Immunizations per orders  Patient counseled about skin care, diet, supplements, prenatal vitamins, safe sex and exercise.      Follow-up: Return in about 4 weeks (around 4/16/2025).    This chart was either fully or partly dictated using an electronic voice recognition software. The chart has been reviewed and edited but there is still possibility for dictation errors due to limitation of software     Chas Younger MD, PGY1  [facility]

## 2025-03-19 NOTE — ASSESSMENT & PLAN NOTE
Doing well post discharge.  Question if there may be a component of COPD given long history of exposure as a .  No smoking history.  -Continue Dulera  -Continue albuterol inhaler and nebulizer as needed  -Continue home oxygen  -PFTs ordered

## 2025-03-21 NOTE — Clinical Note
REFERRAL APPROVAL NOTICE         Sent on March 21, 2025                   Fernando Grissom  [address]  [facility] [address]                   Dear Mr. Goran Grissom,    After a careful review of the medical information and benefit coverage, [facility] has processed your referral. See below for additional details.    If applicable, you must be actively enrolled with your insurance for coverage of the authorized service. If you have any questions regarding your coverage, please contact your insurance directly.    REFERRAL INFORMATION   Referral #:  [id number]  Referred-To Department    Referred-By Provider:  Pulmonology    Chas Younger M.D.   Pulmonary Rehab [facility]      [address]  [place] [address]  [phone] [id number]  [place] [address]  [phone]    Referral Start Date:  03/19/2025  Referral End Date:   03/19/2026             SCHEDULING  If you do not already have an appointment, please call [phone] to make an appointment.     MORE INFORMATION  If you do not already have a [facility] [facility] account, sign up at: [email or web address]  You can access your medical information, make appointments, see lab results, billing information, and more.  If you have questions regarding this referral, please contact  the [facility] Referrals department at:             [phone]. Monday - Friday 8:00AM - 5:00PM.     Sincerely,    [facility]

## 2025-04-07 LAB — NONINV COLON CA DNA+OCC BLD SCRN STL QL: NEGATIVE

## 2025-04-16 ENCOUNTER — RESULTS FOLLOW-UP (OUTPATIENT)
Dept: MEDICAL GROUP | Facility: CLINIC | Age: 75
End: 2025-04-16

## 2025-04-16 ENCOUNTER — OFFICE VISIT (OUTPATIENT)
Dept: MEDICAL GROUP | Facility: CLINIC | Age: 75
End: 2025-04-16
Payer: MEDICARE

## 2025-04-16 VITALS
SYSTOLIC BLOOD PRESSURE: 119 MMHG | TEMPERATURE: 96.9 F | DIASTOLIC BLOOD PRESSURE: 58 MMHG | OXYGEN SATURATION: 88 % | WEIGHT: 190 LBS | BODY MASS INDEX: 27.2 KG/M2 | HEART RATE: 56 BPM | HEIGHT: 70 IN

## 2025-04-16 DIAGNOSIS — J45.909 MODERATE ASTHMA, UNSPECIFIED WHETHER COMPLICATED, UNSPECIFIED WHETHER PERSISTENT: ICD-10-CM

## 2025-04-16 DIAGNOSIS — R09.02 HYPOXIA: ICD-10-CM

## 2025-04-16 PROCEDURE — 94640 AIRWAY INHALATION TREATMENT: CPT

## 2025-04-16 PROCEDURE — 3078F DIAST BP <80 MM HG: CPT | Mod: GE

## 2025-04-16 PROCEDURE — 3074F SYST BP LT 130 MM HG: CPT | Mod: GE

## 2025-04-16 PROCEDURE — 99213 OFFICE O/P EST LOW 20 MIN: CPT | Mod: 25,GE

## 2025-04-16 RX ORDER — ALBUTEROL SULFATE 0.83 MG/ML
2.5 SOLUTION RESPIRATORY (INHALATION) ONCE
Status: COMPLETED | OUTPATIENT
Start: 2025-04-16 | End: 2025-04-16

## 2025-04-16 RX ADMIN — ALBUTEROL SULFATE 2.5 MG: 0.83 SOLUTION RESPIRATORY (INHALATION) at 10:18

## 2025-04-16 ASSESSMENT — FIBROSIS 4 INDEX: FIB4 SCORE: 3.23

## 2025-04-16 NOTE — PROGRESS NOTES
HPI:  Patient is a 74 y.o. male here for   Problem   Hypoxia    Patient presents for follow up of his hypoxia and asthma exacerbation after recent hospital admission. Last visit he was still intermittently using oxygen but now states he no longer needs it. His two daughters, who are both nurses, have weaned him off his home oxygen and he states he feels well. Has no other acute complaints. Has only been using his Dulera and albuterol as needed. Has appointment for updated PFTs.                                                                                                                                    Patient Active Problem List    Diagnosis Date Noted    BPH (benign prostatic hyperplasia) 03/19/2025    Moderate asthma 03/19/2025    Hypoxia 03/12/2025    Mass of skin of left shoulder 12/26/2019    Hyperlipidemia, unspecified 12/09/2019    Hx of lymphoma 11/08/2019    Soft tissue mass 11/08/2019       Current Outpatient Medications   Medication Sig Dispense Refill    mometasone-formoterol (DULERA) 200-5 MCG/ACT Aerosol Inhale 2 Puffs 2 times a day. (Patient not taking: Reported on 4/16/2025) 26 g 0    albuterol (PROVENTIL) 2.5mg/3ml Nebu Soln solution for nebulization Take 3 mL by nebulization every four hours as needed for Shortness of Breath. (Patient not taking: Reported on 4/16/2025) 30 Each 0    acetaminophen (TYLENOL) 325 MG Tab Take 2 Tablets by mouth every four hours as needed for Fever. (Patient not taking: Reported on 4/16/2025) 30 Tablet 0    albuterol 108 (90 Base) MCG/ACT Aero Soln inhalation aerosol Inhale 2 Puffs every 4 hours. (Patient not taking: Reported on 4/16/2025) 34 g 3    Guaifenesin 1200 MG TABLET SR 12 HR Take 1 Tablet by mouth every 12 hours. (Patient not taking: Reported on 4/16/2025) 28 Tablet 3    PSEUDOEPHEDRINE HCL PO Take 2 Tablets by mouth every four hours as needed for Congestion. (Patient not taking: Reported on 4/16/2025)      Dfoccdphx-EJB-SK-APAP (DERECK-SELTZER PLUS COLD  "& FLU PO) Take 1 Tablet by mouth every four hours as needed (cold/ flu). (Patient not taking: Reported on 4/16/2025)       No current facility-administered medications for this visit.         Allergies as of 04/16/2025    (No Known Allergies)          /58 (BP Location: Right arm, Patient Position: Sitting, BP Cuff Size: Adult)   Pulse (!) 56   Temp 36.1 °C (96.9 °F) (Temporal)   Ht 1.765 m (5' 9.5\")   Wt 86.2 kg (190 lb)   SpO2 88%   BMI 27.66 kg/m²     Gen: no fevers/chills, no changes in weight  Eyes: no changes in vision  ENT: no sore throat, no hearing loss, no bloody nose  Pulm: no sob, no cough  CV: no chest pain, no palpitations  GI: no nausea/vomiting, no diarrhea  : no dysuria or flank pain  MSk: no myalgias  Skin: no rash  Psych: no change in mood   Neuro: no headaches, no numbness/tingling  Heme/Lymph: no easy bruising or bleeding    Physical Exam:  Gen:         Alert and oriented, No apparent distress.  Neck:        No Lymphadenopathy or Bruits.  Lungs:     Clear to auscultation bilaterally, no wheezing  CV:           Regular rate and rhythm. No murmurs, rubs or gallops.    Abdomen: soft, nontender, nondistended.    Skin:      no rash or exudate             Ext:          No clubbing, cyanosis, edema.      Assessment and Plan    74 y.o. male with the following-  Problem List Items Addressed This Visit       Hypoxia    Walking test done in office today. Patient desatted to 89%. Albuterol neb ordered and administered in clinic. Patient desatted to 88 with ambulation. Suspect there may be a component of COPD. PFTs pending.  -Continue current oxygen order with activity  -Continue Dulera daily  -Continue albuterol as needed         Moderate asthma     Return in about 4 weeks (around 5/14/2025).      This chart was either fully or partly dictated using an electronic voice recognition software. The chart has been reviewed and edited but there is still possibility for dictation errors due to " limitation of software     Chas Younger MD, PGY1  [facility]

## 2025-04-16 NOTE — ASSESSMENT & PLAN NOTE
Walking test done in office today. Patient desatted to 89%. Albuterol neb ordered and administered in clinic. Patient desatted to 88 with ambulation. Suspect there may be a component of COPD. PFTs pending.  -Continue current oxygen order with activity  -Continue Dulera daily  -Continue albuterol as needed

## 2025-04-23 ENCOUNTER — HOSPITAL ENCOUNTER (OUTPATIENT)
Dept: PULMONOLOGY | Facility: MEDICAL CENTER | Age: 75
End: 2025-04-23
Payer: MEDICARE

## 2025-04-23 DIAGNOSIS — J45.909 MODERATE ASTHMA, UNSPECIFIED WHETHER COMPLICATED, UNSPECIFIED WHETHER PERSISTENT: ICD-10-CM

## 2025-05-06 NOTE — Clinical Note
REFERRAL APPROVAL NOTICE         Sent on May 6, 2025                   Fernando Grissom  [address]  [facility] [place] [address]                   Dear Mr. Goran Grissom,    After a careful review of the medical information and benefit coverage, [facility] has processed your referral. See below for additional details.    If applicable, you must be actively enrolled with your insurance for coverage of the authorized service. If you have any questions regarding your coverage, please contact your insurance directly.    REFERRAL INFORMATION   Referral #:  [id number]  Referred-To Department    Referred-By Provider:  Pulmonology    Chas Younger M.D.   Pulmonary Rehab [facility]      [address]  [place] [address]  [phone] [id number]  [place] [address]  [phone]    Referral Start Date:  05/05/2025  Referral End Date:   05/05/2026             SCHEDULING  If you do not already have an appointment, please call [phone] to make an appointment.     MORE INFORMATION  If you do not already have a [employer] account, sign up at: [email or web address]  You can access your medical information, make appointments, see lab results, billing information, and more.  If you have questions regarding this referral, please contact  the [facility] Referrals department at:             [phone]. Monday - Friday 8:00AM - 5:00PM.     Sincerely,    [facility]

## 2025-07-07 ENCOUNTER — APPOINTMENT (OUTPATIENT)
Dept: RADIOLOGY | Facility: OTHER | Age: 75
End: 2025-07-07
Payer: MEDICARE

## 2025-07-07 ENCOUNTER — APPOINTMENT (OUTPATIENT)
Dept: MEDICAL GROUP | Facility: CLINIC | Age: 75
End: 2025-07-07
Payer: MEDICARE

## 2025-07-07 VITALS
HEIGHT: 68 IN | DIASTOLIC BLOOD PRESSURE: 60 MMHG | WEIGHT: 178 LBS | SYSTOLIC BLOOD PRESSURE: 100 MMHG | HEART RATE: 60 BPM | TEMPERATURE: 97.8 F | BODY MASS INDEX: 26.98 KG/M2 | OXYGEN SATURATION: 95 %

## 2025-07-07 DIAGNOSIS — R09.02 HYPOXIA: ICD-10-CM

## 2025-07-07 DIAGNOSIS — M25.561 CHRONIC PAIN OF RIGHT KNEE: ICD-10-CM

## 2025-07-07 DIAGNOSIS — G89.29 CHRONIC PAIN OF RIGHT KNEE: ICD-10-CM

## 2025-07-07 DIAGNOSIS — J45.909 MODERATE ASTHMA, UNSPECIFIED WHETHER COMPLICATED, UNSPECIFIED WHETHER PERSISTENT: ICD-10-CM

## 2025-07-07 DIAGNOSIS — Z23 NEED FOR VACCINATION: Primary | ICD-10-CM

## 2025-07-07 PROCEDURE — 90746 HEPB VACCINE 3 DOSE ADULT IM: CPT | Performed by: FAMILY MEDICINE

## 2025-07-07 PROCEDURE — 99213 OFFICE O/P EST LOW 20 MIN: CPT | Mod: 25,GE

## 2025-07-07 PROCEDURE — 3078F DIAST BP <80 MM HG: CPT

## 2025-07-07 PROCEDURE — 3074F SYST BP LT 130 MM HG: CPT

## 2025-07-07 PROCEDURE — G0010 ADMIN HEPATITIS B VACCINE: HCPCS | Performed by: FAMILY MEDICINE

## 2025-07-07 PROCEDURE — 73564 X-RAY EXAM KNEE 4 OR MORE: CPT | Mod: TC,FY,RT | Performed by: FAMILY MEDICINE

## 2025-07-07 ASSESSMENT — FIBROSIS 4 INDEX: FIB4 SCORE: 3.23

## 2025-07-07 NOTE — ASSESSMENT & PLAN NOTE
Walk test done in clinic maintaining saturation above 94 while walking.  Cleared to discontinue oxygen.  -Patient is cleared to discontinue home oxygen.  -New home Dulera and albuterol  -Repeat PFTs ordered

## 2025-07-07 NOTE — PROGRESS NOTES
"   HPI:  Patient is a 74 y.o. male here for   Problem   Right Knee Pain    Reports intermittent R knee pain for approximately 2 years since having a fall he hit the inside of his right knee on a concrete block. Feels pain when kneeling down or gettting into his car. Typically 5/10 pain, has tried ibuporfen/tylenol and topical creams with minimal relief.  Daughter reports over the past few months she notes significant impairment in daily life especially with kneeling down and standing up from sitting.  She sometimes knows he will have a slight limp.  Pain does not radiate.  His knee is never swollen or red.     Moderate Asthma    Patient presents for follow up of his hypoxia and asthma exacerbation after hospital admission in March 2025.  Last visit attempted walking test to discontinue oxygen but patient failed. His two daughters, who are both nurses, have weaned him off his home oxygen and he states he feels well.  Does check his oxygen saturation while working and reports it stays above 92.  Continues to use his Dulera daily and albuterol as needed.  Has only required albuterol a couple times a month.     Hypoxia                                                                                                                                  Patient Active Problem List    Diagnosis Date Noted    Right knee pain 07/07/2025    BPH (benign prostatic hyperplasia) 03/19/2025    Moderate asthma 03/19/2025    Hypoxia 03/12/2025    Mass of skin of left shoulder 12/26/2019    Hyperlipidemia, unspecified 12/09/2019    Hx of lymphoma 11/08/2019    Soft tissue mass 11/08/2019       Current Medications[1]      Allergies as of 07/07/2025    (No Known Allergies)          /60 (BP Location: Right arm, Patient Position: Sitting, BP Cuff Size: Adult)   Pulse 60   Temp 36.6 °C (97.8 °F)   Ht 1.727 m (5' 8\")   Wt 80.7 kg (178 lb)   SpO2 95%   BMI 27.06 kg/m²     Gen: no fevers/chills, no changes in weight  Eyes: no changes " in vision  ENT: no sore throat, no hearing loss, no bloody nose  Pulm: no sob, no cough  CV: no chest pain, no palpitations  GI: no nausea/vomiting, no diarrhea  : no dysuria or flank pain  MSk: no myalgias  Skin: no rash  Psych: no change in mood   Neuro: no headaches, no numbness/tingling  Heme/Lymph: no easy bruising or bleeding    Physical Exam:  Gen:         Alert and oriented, No apparent distress.  Neck:        No Lymphadenopathy or Bruits.  Lungs:     Clear to auscultation bilaterally  CV:           Regular rate and rhythm. No murmurs, rubs or gallops.    Abdomen: soft, nontender, nondistended.    Skin:      no rash or exudate             Ext:          No clubbing, cyanosis, edema.  Knee: Right knee with mild tenderness to palpation to the medial aspect of the joint line.  No varus or valgus laxity.  Anterior drawer and posterior drawer negative.  Patella normal.  Left knee normal.      Assessment and Plan    74 y.o. male with the following-  Problem List Items Addressed This Visit       Hypoxia    Relevant Orders    PULMONARY FUNCTION TESTS -Test requested: Complete Pulmonary Function Test    Moderate asthma    Walk test done in clinic maintaining saturation above 94 while walking.  Cleared to discontinue oxygen.  -Patient is cleared to discontinue home oxygen.  -New home Dulera and albuterol  -Repeat PFTs ordered         Relevant Medications    mometasone-formoterol (DULERA) 200-5 MCG/ACT Aerosol    Other Relevant Orders    PULMONARY FUNCTION TESTS -Test requested: Complete Pulmonary Function Test    Right knee pain    Patient with right knee pain consistent with likely osteoarthritis.  Will order knee x-rays to evaluate severity of disease and for any other possible bony abnormalities.  - Right knee x-rays  -Continue Tylenol/ibuprofen         Relevant Orders    DX-KNEE COMPLETE 4+ RIGHT     Other Visit Diagnoses         Need for vaccination    -  Primary    Relevant Medications    Zoster Vac Recomb  Adjuvanted (SHINGRIX) 50 MCG/0.5ML Recon Susp    Other Relevant Orders    Hepatitis B Vaccine Adult IM (Completed)          Return in about 4 weeks (around 8/4/2025).      This chart was either fully or partly dictated using an electronic voice recognition software. The chart has been reviewed and edited but there is still possibility for dictation errors due to limitation of software     Chas Younger MD, PGY2         [1]   Current Outpatient Medications   Medication Sig Dispense Refill    Zoster Vac Recomb Adjuvanted (SHINGRIX) 50 MCG/0.5ML Recon Susp Inject 0.5 mL into the shoulder, thigh, or buttocks one time for 1 dose. 0.5 mL 0    mometasone-formoterol (DULERA) 200-5 MCG/ACT Aerosol Inhale 2 Puffs by mouth 2 times a day. 26 g 0    albuterol (PROVENTIL) 2.5mg/3ml Nebu Soln solution for nebulization Take 3 mL by nebulization every four hours as needed for Shortness of Breath. (Patient not taking: Reported on 4/16/2025) 30 Each 0    acetaminophen (TYLENOL) 325 MG Tab Take 2 Tablets by mouth every four hours as needed for Fever. (Patient not taking: Reported on 4/16/2025) 30 Tablet 0    albuterol 108 (90 Base) MCG/ACT Aero Soln inhalation aerosol Inhale 2 Puffs every 4 hours. (Patient not taking: Reported on 4/16/2025) 34 g 3    Guaifenesin 1200 MG TABLET SR 12 HR Take 1 Tablet by mouth every 12 hours. (Patient not taking: Reported on 4/16/2025) 28 Tablet 3    PSEUDOEPHEDRINE HCL PO Take 2 Tablets by mouth every four hours as needed for Congestion. (Patient not taking: Reported on 4/16/2025)      Mgkkogaoc-MQF-CE-APAP (DERECK-SELTZER PLUS COLD & FLU PO) Take 1 Tablet by mouth every four hours as needed (cold/ flu). (Patient not taking: Reported on 4/16/2025)       No current facility-administered medications for this visit.

## 2025-07-07 NOTE — ASSESSMENT & PLAN NOTE
Patient with right knee pain consistent with likely osteoarthritis.  Will order knee x-rays to evaluate severity of disease and for any other possible bony abnormalities.  - Right knee x-rays  -Continue Tylenol/ibuprofen

## 2025-07-11 NOTE — Clinical Note
REFERRAL APPROVAL NOTICE         Sent on July 10, 2025                   Fernando Grissom  [address]  [facility] [address]                   Dear Mr. Goran Grissom,    After a careful review of the medical information and benefit coverage, [facility] has processed your referral. See below for additional details.    If applicable, you must be actively enrolled with your insurance for coverage of the authorized service. If you have any questions regarding your coverage, please contact your insurance directly.    REFERRAL INFORMATION   Referral #:  [id number]  Referred-To Department    Referred-By Provider:  Pulmonology    Chas Younger M.D.   Pulmonary Rehab [facility]      [address]  [place] [address]  [phone] [id number]  [place] [address]  [phone]    Referral Start Date:  07/07/2025  Referral End Date:   07/07/2026             SCHEDULING  If you do not already have an appointment, please call [phone] to make an appointment.     MORE INFORMATION  If you do not already have a [employer] account, sign up at: [email or web address]  You can access your medical information, make appointments, see lab results, billing information, and more.  If you have questions regarding this referral, please contact  the [facility] Referrals department at:             [phone]. Monday - Friday 8:00AM - 5:00PM.     Sincerely,    [facility]

## (undated) DEVICE — SENSOR SPO2 NEO LNCS ADHESIVE (20/BX) SEE USER NOTES

## (undated) DEVICE — DRESSING TRANSPARENT FILM TEGADERM 4 X 4.75" (50EA/BX)"

## (undated) DEVICE — TUBE CONNECTING SUCTION - CLEAR PLASTIC STERILE 72 IN (50EA/CA)

## (undated) DEVICE — CATHETER IV 20 GA X 1-1/4 ---SURG.& SDS ONLY--- (50EA/BX)

## (undated) DEVICE — SODIUM CHL IRRIGATION 0.9% 1000ML (12EA/CA)

## (undated) DEVICE — CANISTER SUCTION 3000ML MECHANICAL FILTER AUTO SHUTOFF MEDI-VAC NONSTERILE LF DISP  (40EA/CA)

## (undated) DEVICE — GLOVE BIOGEL SZ 7.5 SURGICAL PF LTX - (50PR/BX 4BX/CA)

## (undated) DEVICE — KIT  I.V. START (100EA/CA)

## (undated) DEVICE — CLOSURE SKIN STRIP 1/2 X 4 IN - (STERI STRIP) (50/BX 4BX/CA)

## (undated) DEVICE — DRAPE SURGICAL U 77X120 - (10/CA)

## (undated) DEVICE — LACTATED RINGERS INJ 1000 ML - (14EA/CA 60CA/PF)

## (undated) DEVICE — SUCTION INSTRUMENT YANKAUER BULBOUS TIP W/O VENT (50EA/CA)

## (undated) DEVICE — SUTURE 3-0 VICRYL PLUS SH - 8X 18 INCH (12/BX)

## (undated) DEVICE — DRESSING TRANSPARENT FILM TEGADERM 2.375 X 2.75"  (100EA/BX)"

## (undated) DEVICE — HEAD HOLDER JUNIOR/ADULT

## (undated) DEVICE — ELECTRODE 850 FOAM ADHESIVE - HYDROGEL RADIOTRNSPRNT (50/PK)

## (undated) DEVICE — SUTURE GENERAL

## (undated) DEVICE — GOWN WARMING STANDARD FLEX - (30/CA)

## (undated) DEVICE — MASK ANESTHESIA ADULT  - (100/CA)

## (undated) DEVICE — TUBING CLEARLINK DUO-VENT - C-FLO (48EA/CA)

## (undated) DEVICE — SUTURE 4-0 VICRYL PLUS FS-2 - 27 INCH (36/BX)

## (undated) DEVICE — KIT ANESTHESIA W/CIRCUIT & 3/LT BAG W/FILTER (20EA/CA)

## (undated) DEVICE — PROTECTOR ULNA NERVE - (36PR/CA)

## (undated) DEVICE — SLEEVE, VASO, THIGH, MED

## (undated) DEVICE — SPONGE GAUZESTER 4 X 4 4PLY - (128PK/CA)

## (undated) DEVICE — ELECTRODE DUAL RETURN W/ CORD - (50/PK)

## (undated) DEVICE — SPONGE GAUZESTER. 2X2 4-PL - (2/PK 50PK/BX 30BX/CS)

## (undated) DEVICE — PACK MINOR BASIN - (2EA/CA)

## (undated) DEVICE — DRAPE LAPAROTOMY T SHEET - (12EA/CA)

## (undated) DEVICE — CHLORAPREP 26 ML APPLICATOR - ORANGE TINT(25/CA)

## (undated) DEVICE — NEPTUNE 4 PORT MANIFOLD - (20/PK)

## (undated) DEVICE — SET LEADWIRE 5 LEAD BEDSIDE DISPOSABLE ECG (1SET OF 5/EA)

## (undated) DEVICE — CANISTER SUCTION RIGID RED 1500CC (40EA/CA)